# Patient Record
Sex: FEMALE | Race: WHITE | Employment: FULL TIME | URBAN - METROPOLITAN AREA
[De-identification: names, ages, dates, MRNs, and addresses within clinical notes are randomized per-mention and may not be internally consistent; named-entity substitution may affect disease eponyms.]

---

## 2017-01-09 RX ORDER — TRAMADOL HYDROCHLORIDE 50 MG/1
TABLET ORAL
Qty: 90 TABLET | Refills: 0 | Status: SHIPPED | OUTPATIENT
Start: 2017-01-09 | End: 2017-08-11 | Stop reason: SDUPTHER

## 2017-02-13 RX ORDER — RABEPRAZOLE SODIUM 20 MG
TABLET, DELAYED RELEASE (ENTERIC COATED) ORAL
Qty: 90 TABLET | Refills: 2 | Status: SHIPPED | OUTPATIENT
Start: 2017-02-13 | End: 2017-08-11 | Stop reason: SDUPTHER

## 2017-05-15 ENCOUNTER — TELEPHONE (OUTPATIENT)
Dept: FAMILY MEDICINE CLINIC | Age: 52
End: 2017-05-15

## 2017-08-11 ENCOUNTER — TELEPHONE (OUTPATIENT)
Dept: FAMILY MEDICINE CLINIC | Age: 52
End: 2017-08-11

## 2017-08-11 ENCOUNTER — OFFICE VISIT (OUTPATIENT)
Dept: FAMILY MEDICINE CLINIC | Age: 52
End: 2017-08-11
Payer: COMMERCIAL

## 2017-08-11 VITALS
HEIGHT: 60 IN | OXYGEN SATURATION: 98 % | BODY MASS INDEX: 29.21 KG/M2 | RESPIRATION RATE: 12 BRPM | HEART RATE: 67 BPM | DIASTOLIC BLOOD PRESSURE: 64 MMHG | SYSTOLIC BLOOD PRESSURE: 118 MMHG | WEIGHT: 148.8 LBS

## 2017-08-11 DIAGNOSIS — Z98.84 HISTORY OF ROUX-EN-Y GASTRIC BYPASS: ICD-10-CM

## 2017-08-11 DIAGNOSIS — M25.551 RIGHT HIP PAIN: ICD-10-CM

## 2017-08-11 DIAGNOSIS — R25.2 MUSCLE CRAMPS: ICD-10-CM

## 2017-08-11 DIAGNOSIS — Z00.00 WELL ADULT HEALTH CHECK: Primary | ICD-10-CM

## 2017-08-11 DIAGNOSIS — K21.9 GASTROESOPHAGEAL REFLUX DISEASE, ESOPHAGITIS PRESENCE NOT SPECIFIED: ICD-10-CM

## 2017-08-11 DIAGNOSIS — R07.9 CHEST PAIN SYNDROME: ICD-10-CM

## 2017-08-11 DIAGNOSIS — M25.50 POLYARTHRALGIA: ICD-10-CM

## 2017-08-11 DIAGNOSIS — Z78.0 POST-MENOPAUSAL: ICD-10-CM

## 2017-08-11 DIAGNOSIS — M25.552 LEFT HIP PAIN: ICD-10-CM

## 2017-08-11 PROCEDURE — 99396 PREV VISIT EST AGE 40-64: CPT | Performed by: FAMILY MEDICINE

## 2017-08-11 RX ORDER — TRAMADOL HYDROCHLORIDE 50 MG/1
TABLET ORAL
Qty: 90 TABLET | Refills: 0 | Status: SHIPPED | OUTPATIENT
Start: 2017-08-11 | End: 2017-08-15 | Stop reason: SDUPTHER

## 2017-08-11 RX ORDER — RABEPRAZOLE SODIUM 20 MG
TABLET, DELAYED RELEASE (ENTERIC COATED) ORAL
Qty: 90 TABLET | Refills: 3 | Status: SHIPPED | OUTPATIENT
Start: 2017-08-11 | End: 2017-08-11

## 2017-08-11 RX ORDER — METOPROLOL SUCCINATE 25 MG/1
25 TABLET, EXTENDED RELEASE ORAL 2 TIMES DAILY
Qty: 180 TABLET | Refills: 3 | Status: SHIPPED | OUTPATIENT
Start: 2017-08-11 | End: 2017-08-21

## 2017-08-11 RX ORDER — PANTOPRAZOLE SODIUM 40 MG/1
40 TABLET, DELAYED RELEASE ORAL
Qty: 90 TABLET | Refills: 3 | Status: SHIPPED | OUTPATIENT
Start: 2017-08-11 | End: 2018-03-19 | Stop reason: ALTCHOICE

## 2017-08-11 ASSESSMENT — PATIENT HEALTH QUESTIONNAIRE - PHQ9
SUM OF ALL RESPONSES TO PHQ QUESTIONS 1-9: 0
1. LITTLE INTEREST OR PLEASURE IN DOING THINGS: 0
SUM OF ALL RESPONSES TO PHQ9 QUESTIONS 1 & 2: 0
2. FEELING DOWN, DEPRESSED OR HOPELESS: 0

## 2017-08-11 ASSESSMENT — ENCOUNTER SYMPTOMS
RESPIRATORY NEGATIVE: 1
GASTROINTESTINAL NEGATIVE: 1

## 2017-08-14 ENCOUNTER — PATIENT MESSAGE (OUTPATIENT)
Dept: FAMILY MEDICINE CLINIC | Age: 52
End: 2017-08-14

## 2017-08-14 DIAGNOSIS — M25.50 POLYARTHRALGIA: ICD-10-CM

## 2017-08-15 RX ORDER — TRAMADOL HYDROCHLORIDE 50 MG/1
TABLET ORAL
Qty: 90 TABLET | Refills: 0 | Status: SHIPPED | OUTPATIENT
Start: 2017-08-15 | End: 2018-08-13 | Stop reason: SDUPTHER

## 2017-08-29 ENCOUNTER — HOSPITAL ENCOUNTER (OUTPATIENT)
Age: 52
Discharge: HOME | End: 2017-08-29
Payer: COMMERCIAL

## 2017-08-29 ENCOUNTER — HOSPITAL ENCOUNTER (OUTPATIENT)
Dept: GENERAL RADIOLOGY | Age: 52
Discharge: HOME | End: 2017-08-29
Payer: COMMERCIAL

## 2017-08-29 DIAGNOSIS — M25.551 RIGHT HIP PAIN: ICD-10-CM

## 2017-08-29 DIAGNOSIS — M25.552 LEFT HIP PAIN: ICD-10-CM

## 2017-08-29 PROCEDURE — 73502 X-RAY EXAM HIP UNI 2-3 VIEWS: CPT

## 2018-03-19 ENCOUNTER — PATIENT MESSAGE (OUTPATIENT)
Dept: FAMILY MEDICINE CLINIC | Age: 53
End: 2018-03-19

## 2018-03-19 DIAGNOSIS — K21.9 GASTROESOPHAGEAL REFLUX DISEASE, ESOPHAGITIS PRESENCE NOT SPECIFIED: Primary | ICD-10-CM

## 2018-03-19 RX ORDER — RABEPRAZOLE SODIUM 20 MG/1
20 TABLET, DELAYED RELEASE ORAL DAILY
Qty: 30 TABLET | Refills: 11 | Status: SHIPPED | OUTPATIENT
Start: 2018-03-19 | End: 2018-05-15 | Stop reason: SDUPTHER

## 2018-05-15 DIAGNOSIS — K21.9 GASTROESOPHAGEAL REFLUX DISEASE, ESOPHAGITIS PRESENCE NOT SPECIFIED: ICD-10-CM

## 2018-05-15 RX ORDER — RABEPRAZOLE SODIUM 20 MG/1
20 TABLET, DELAYED RELEASE ORAL DAILY
Qty: 90 TABLET | Refills: 3 | Status: SHIPPED | OUTPATIENT
Start: 2018-05-15 | End: 2019-07-29 | Stop reason: SDUPTHER

## 2018-08-13 ENCOUNTER — OFFICE VISIT (OUTPATIENT)
Dept: FAMILY MEDICINE CLINIC | Age: 53
End: 2018-08-13
Payer: COMMERCIAL

## 2018-08-13 VITALS
HEART RATE: 68 BPM | BODY MASS INDEX: 28.84 KG/M2 | RESPIRATION RATE: 16 BRPM | HEIGHT: 63 IN | WEIGHT: 162.8 LBS | DIASTOLIC BLOOD PRESSURE: 74 MMHG | SYSTOLIC BLOOD PRESSURE: 128 MMHG

## 2018-08-13 DIAGNOSIS — K21.9 GASTROESOPHAGEAL REFLUX DISEASE, ESOPHAGITIS PRESENCE NOT SPECIFIED: ICD-10-CM

## 2018-08-13 DIAGNOSIS — Z12.31 ENCOUNTER FOR SCREENING MAMMOGRAM FOR BREAST CANCER: ICD-10-CM

## 2018-08-13 DIAGNOSIS — M25.551 RIGHT HIP PAIN: ICD-10-CM

## 2018-08-13 DIAGNOSIS — Z00.00 WELL ADULT HEALTH CHECK: Primary | ICD-10-CM

## 2018-08-13 DIAGNOSIS — Z78.0 POST-MENOPAUSAL: ICD-10-CM

## 2018-08-13 DIAGNOSIS — Z98.84 HISTORY OF ROUX-EN-Y GASTRIC BYPASS: ICD-10-CM

## 2018-08-13 DIAGNOSIS — M25.50 POLYARTHRALGIA: ICD-10-CM

## 2018-08-13 PROCEDURE — 99396 PREV VISIT EST AGE 40-64: CPT | Performed by: FAMILY MEDICINE

## 2018-08-13 RX ORDER — TRAMADOL HYDROCHLORIDE 50 MG/1
TABLET ORAL
Qty: 90 TABLET | Refills: 0 | Status: SHIPPED | OUTPATIENT
Start: 2018-08-13 | End: 2018-11-13

## 2018-08-13 ASSESSMENT — PATIENT HEALTH QUESTIONNAIRE - PHQ9
SUM OF ALL RESPONSES TO PHQ QUESTIONS 1-9: 0
1. LITTLE INTEREST OR PLEASURE IN DOING THINGS: 0
SUM OF ALL RESPONSES TO PHQ9 QUESTIONS 1 & 2: 0
SUM OF ALL RESPONSES TO PHQ QUESTIONS 1-9: 0
2. FEELING DOWN, DEPRESSED OR HOPELESS: 0

## 2018-08-13 ASSESSMENT — ENCOUNTER SYMPTOMS
RESPIRATORY NEGATIVE: 1
GASTROINTESTINAL NEGATIVE: 1

## 2018-08-13 NOTE — PROGRESS NOTES
Subjective:      Patient ID: Sumanth Everett is a 47 y.o. female. HPI:    Chief Complaint   Patient presents with    Annual Exam    Results     Annual eval.  Doing well overall. GERD well controlled. Hx of rahul-en-Y. Weight has creeped up from this time last year. Wt Readings from Last 3 Encounters:   08/13/18 162 lb 12.8 oz (73.8 kg)   08/11/17 148 lb 12.8 oz (67.5 kg)   07/25/16 157 lb (71.2 kg)     Labs in the fall looked fine. Pt continues to have bl hip pain. Rheum work up neg. Right hip > left. XRs neg. Right hip will \"pop out\" at times. Patient Active Problem List   Diagnosis    GERD (gastroesophageal reflux disease)    Post-menopausal    History of Rahul-en-Y gastric bypass    DDD (degenerative disc disease), cervical    Chest pain syndrome    SOB (shortness of breath)     Past Surgical History:   Procedure Laterality Date    BREAST SURGERY      tumors    GASTRIC BYPASS SURGERY  2006    HERNIA REPAIR  1969    HYSTERECTOMY      OVARIAN CYST REMOVAL      multiple    TONSILLECTOMY      TOOTH EXTRACTION  2018    UPPER GASTROINTESTINAL ENDOSCOPY       Prior to Admission medications    Medication Sig Start Date End Date Taking? Authorizing Provider   metoprolol tartrate (LOPRESSOR) 25 MG tablet TAKE 1 TABLET TWICE A DAY 8/13/18  Yes Sanjay Shea DO   RABEprazole (ACIPHEX) 20 MG tablet Take 1 tablet by mouth daily 5/15/18  Yes Sanjay Shea DO   traMADol (ULTRAM) 50 MG tablet TAKE 1 TABLET BY MOUTH EVERY 8 HOURS AS NEEDED FOR PAIN 8/15/17  Yes Sanjay Shea DO   aspirin 81 MG tablet Take 81 mg by mouth daily. Yes Historical Provider, MD   estrogens, conjugated, (PREMARIN) 0.625 MG tablet Take 0.625 mg by mouth daily.    Yes Historical Provider, MD       No results found for: LABA1C  No results found for: EAG    No components found for: CHLPL  Lab Results   Component Value Date    TRIG 82 02/14/2017    TRIG 69 02/19/2016    TRIG 78 02/16/2015     Lab Results

## 2018-08-13 NOTE — PROGRESS NOTES
Chronic Disease Visit Information    BP Readings from Last 3 Encounters:   08/13/18 128/74   08/11/17 118/64   07/25/16 120/80          LDL Calculated (mg/dl)   Date Value   02/14/2017 101     HDL (mg/dl)   Date Value   02/14/2017 91     BUN (mg/dl)   Date Value   02/14/2017 12     CREATININE (mg/dl)   Date Value   02/14/2017 0.7     Glucose (mg/dl)   Date Value   02/14/2017 88            Have you changed or started any medications since your last visit including any over-the-counter medicines, vitamins, or herbal medicines? no   Are you having any side effects from any of your medications? -  no  Have you stopped taking any of your medications? Is so, why? -  no    Have you seen any other physician or provider since your last visit? No  Have you had any other diagnostic tests since your last visit? No  Have you been seen in the emergency room and/or had an admission to a hospital since we last saw you? No  Have you had your annual diabetic retinal (eye) exam? No  Have you had your routine dental cleaning in the past 6 months? No    Have you activated your [employer] account? If not, what are your barriers?  Yes     Patient Care Team:  Juan Carlos Govea DO as PCP - General (Family Medicine)  Juan Carlos Govea DO as PCP - [facility] Attributed Provider  Lake Bergman MD as Obstetrician (Obstetrics & Gynecology)         Medical History Review  Past Medical, Family, and Social History reviewed and does contribute to the patient presenting condition    Health Maintenance   Topic Date Due    HIV screen  10/07/1981    DTaP/Tdap/Td vaccine (1 - Tdap) 10/07/1985    Cervical cancer screen  10/07/1987    Shingles Vaccine (1 of 2 - 2 Dose Series) 10/07/2016    Colon cancer screen colonoscopy  10/07/2016    Breast cancer screen  08/19/2017    Flu vaccine (1) 09/01/2018    Lipid screen  02/14/2022   ;

## 2018-08-21 ENCOUNTER — PATIENT MESSAGE (OUTPATIENT)
Dept: FAMILY MEDICINE CLINIC | Age: 53
End: 2018-08-21

## 2018-08-21 RX ORDER — VALACYCLOVIR HYDROCHLORIDE 1 G/1
1000 TABLET, FILM COATED ORAL 3 TIMES DAILY
Qty: 21 TABLET | Refills: 0 | Status: SHIPPED | OUTPATIENT
Start: 2018-08-21 | End: 2018-08-22 | Stop reason: SDUPTHER

## 2018-08-22 RX ORDER — VALACYCLOVIR HYDROCHLORIDE 1 G/1
1000 TABLET, FILM COATED ORAL 3 TIMES DAILY
Qty: 21 TABLET | Refills: 0 | Status: SHIPPED | OUTPATIENT
Start: 2018-08-22 | End: 2018-08-29

## 2018-08-22 NOTE — TELEPHONE ENCOUNTER
From: Tania Mayer  To: Delmi Rider DO  Sent: 8/21/2018 3:16 PM EDT  Subject: Non-Urgent Medical Question    As long as I keep the lesions covered with bandages and keep my hands washed am I ok to work. I feel ok and I know shingles are contagious. None of the lesions are open at this point. They are just itchy.     ----- Message -----  From: Delmi Rider DO  Sent: 8/21/2018 8:39 AM EDT  To: Murtaza Mayer  Subject: RE: Non-Urgent Medical Question  Agree that you likely have shingles. rx for Valtrex has been sent to your pharmacy to start ASAP.    ----- Message -----   From: Tania Mayer   Sent: 8/21/2018 8:14 AM EDT   To: Delmi Rider DO  Subject: Non-Urgent Medical Question    I saw Dr Amita Galicia last Monday and then on Friday 8/17 I started with a tingling sensitivity in my right arm and neck. Now I have an area on my right shoulder that is a cluster of blisters and it has spread a few blisters up on my shoulder. My neck is sore. I'm afraid it is shingles. Do I need to come back in or can something be sent in to [facility] for me.    Thanks  Sebastián Mcgregor  [phone]

## 2019-07-29 DIAGNOSIS — K21.9 GASTROESOPHAGEAL REFLUX DISEASE, ESOPHAGITIS PRESENCE NOT SPECIFIED: ICD-10-CM

## 2019-07-30 RX ORDER — RABEPRAZOLE SODIUM 20 MG/1
20 TABLET, DELAYED RELEASE ORAL DAILY
Qty: 90 TABLET | Refills: 3 | Status: SHIPPED | OUTPATIENT
Start: 2019-07-30 | End: 2020-08-10 | Stop reason: SDUPTHER

## 2019-10-11 ENCOUNTER — HOSPITAL ENCOUNTER (OUTPATIENT)
Dept: WOMENS IMAGING | Age: 54
Discharge: HOME | End: 2019-10-11
Payer: COMMERCIAL

## 2019-10-11 DIAGNOSIS — Z12.31 VISIT FOR SCREENING MAMMOGRAM: ICD-10-CM

## 2019-10-11 PROCEDURE — 77063 BREAST TOMOSYNTHESIS BI: CPT

## 2019-11-08 ENCOUNTER — OFFICE VISIT (OUTPATIENT)
Dept: FAMILY MEDICINE CLINIC | Age: 54
End: 2019-11-08
Payer: COMMERCIAL

## 2019-11-08 VITALS
BODY MASS INDEX: 28.49 KG/M2 | WEIGHT: 166.9 LBS | DIASTOLIC BLOOD PRESSURE: 80 MMHG | SYSTOLIC BLOOD PRESSURE: 120 MMHG | HEART RATE: 58 BPM | HEIGHT: 64 IN | RESPIRATION RATE: 13 BRPM | TEMPERATURE: 98 F | OXYGEN SATURATION: 98 %

## 2019-11-08 DIAGNOSIS — J34.89 SORE IN NOSE: Primary | ICD-10-CM

## 2019-11-08 PROCEDURE — 99213 OFFICE O/P EST LOW 20 MIN: CPT | Performed by: FAMILY MEDICINE

## 2019-11-08 RX ORDER — TRIAMCINOLONE ACETONIDE 0.25 MG/G
OINTMENT TOPICAL
Qty: 15 G | Refills: 1 | Status: SHIPPED | OUTPATIENT
Start: 2019-11-08 | End: 2019-11-15

## 2019-11-08 ASSESSMENT — PATIENT HEALTH QUESTIONNAIRE - PHQ9
2. FEELING DOWN, DEPRESSED OR HOPELESS: 0
SUM OF ALL RESPONSES TO PHQ QUESTIONS 1-9: 0
SUM OF ALL RESPONSES TO PHQ QUESTIONS 1-9: 0
1. LITTLE INTEREST OR PLEASURE IN DOING THINGS: 0
SUM OF ALL RESPONSES TO PHQ9 QUESTIONS 1 & 2: 0

## 2019-11-08 ASSESSMENT — ENCOUNTER SYMPTOMS
GASTROINTESTINAL NEGATIVE: 1
RESPIRATORY NEGATIVE: 1

## 2020-01-20 ENCOUNTER — HOSPITAL ENCOUNTER (EMERGENCY)
Age: 55
Discharge: HOME | End: 2020-01-20
Payer: COMMERCIAL

## 2020-01-20 VITALS
BODY MASS INDEX: 28.17 KG/M2 | RESPIRATION RATE: 20 BRPM | WEIGHT: 165 LBS | SYSTOLIC BLOOD PRESSURE: 155 MMHG | HEART RATE: 91 BPM | TEMPERATURE: 100.9 F | HEIGHT: 64 IN | DIASTOLIC BLOOD PRESSURE: 93 MMHG | OXYGEN SATURATION: 96 %

## 2020-01-20 LAB
FLU A ANTIGEN: POSITIVE
INFLUENZA B AG, EIA: NEGATIVE

## 2020-01-20 PROCEDURE — 99214 OFFICE O/P EST MOD 30 MIN: CPT

## 2020-01-20 PROCEDURE — 99213 OFFICE O/P EST LOW 20 MIN: CPT | Performed by: NURSE PRACTITIONER

## 2020-01-20 PROCEDURE — 87804 INFLUENZA ASSAY W/OPTIC: CPT

## 2020-01-20 RX ORDER — ALBUTEROL SULFATE 90 UG/1
2 AEROSOL, METERED RESPIRATORY (INHALATION) EVERY 6 HOURS PRN
Qty: 1 INHALER | Refills: 0 | Status: SHIPPED | OUTPATIENT
Start: 2020-01-20 | End: 2021-08-06

## 2020-01-20 RX ORDER — CYANOCOBALAMIN (VITAMIN B-12) 1000 MCG
1 TABLET, EXTENDED RELEASE ORAL 2 TIMES DAILY WITH MEALS
COMMUNITY
End: 2022-06-15

## 2020-01-20 RX ORDER — ACETAMINOPHEN 325 MG/1
650 TABLET ORAL EVERY 6 HOURS PRN
COMMUNITY
End: 2022-06-15

## 2020-01-20 RX ORDER — ONDANSETRON 4 MG/1
4 TABLET, ORALLY DISINTEGRATING ORAL EVERY 8 HOURS PRN
Qty: 15 TABLET | Refills: 0 | Status: SHIPPED | OUTPATIENT
Start: 2020-01-20 | End: 2021-08-06

## 2020-01-20 RX ORDER — AZELASTINE 1 MG/ML
1 SPRAY, METERED NASAL 2 TIMES DAILY
Qty: 1 BOTTLE | Refills: 0 | Status: SHIPPED | OUTPATIENT
Start: 2020-01-20 | End: 2021-08-06

## 2020-01-20 RX ORDER — IBUPROFEN 400 MG/1
400 TABLET ORAL EVERY 6 HOURS PRN
Qty: 30 TABLET | Refills: 0 | Status: SHIPPED | OUTPATIENT
Start: 2020-01-20 | End: 2022-06-15

## 2020-01-20 RX ORDER — OSELTAMIVIR PHOSPHATE 75 MG/1
75 CAPSULE ORAL 2 TIMES DAILY
Qty: 10 CAPSULE | Refills: 0 | Status: SHIPPED | OUTPATIENT
Start: 2020-01-20 | End: 2020-01-25

## 2020-01-20 ASSESSMENT — ENCOUNTER SYMPTOMS
ANAL BLEEDING: 0
DIARRHEA: 0
CHOKING: 0
BLOOD IN STOOL: 0
SORE THROAT: 1
APNEA: 0
VOMITING: 0
NAUSEA: 1
ABDOMINAL DISTENTION: 0
ABDOMINAL PAIN: 0
WHEEZING: 0
COUGH: 1
STRIDOR: 0
RHINORRHEA: 1
SHORTNESS OF BREATH: 0
CHEST TIGHTNESS: 1
SINUS PRESSURE: 1

## 2020-01-20 ASSESSMENT — PAIN SCALES - GENERAL: PAINLEVEL_OUTOF10: 5

## 2020-01-20 ASSESSMENT — PAIN DESCRIPTION - PAIN TYPE: TYPE: ACUTE PAIN

## 2020-01-20 ASSESSMENT — PAIN DESCRIPTION - DESCRIPTORS: DESCRIPTORS: DISCOMFORT

## 2020-01-20 ASSESSMENT — PAIN - FUNCTIONAL ASSESSMENT: PAIN_FUNCTIONAL_ASSESSMENT: PREVENTS OR INTERFERES SOME ACTIVE ACTIVITIES AND ADLS

## 2020-01-20 ASSESSMENT — PAIN DESCRIPTION - LOCATION: LOCATION: GENERALIZED

## 2020-01-20 NOTE — ED PROVIDER NOTES
[facility]  Urgent Care Encounter      CHIEF COMPLAINT       Chief Complaint   Patient presents with    Fever     103.7 at home    Cough    Generalized Body Aches       Nurses Notes reviewed and I agree except as noted in the HPI. HISTORY OFPRESENT ILLNESS   Jeffry Boateng Age is a 49 y.o. The history is provided by the patient and a relative. No  was used. URI   Presenting symptoms: congestion, cough, fatigue, rhinorrhea and sore throat    Associated symptoms: headaches and myalgias    Associated symptoms: no wheezing        REVIEW OF SYSTEMS     Review of Systems   Constitutional: Positive for activity change, chills and fatigue. Negative for diaphoresis. HENT: Positive for congestion, rhinorrhea, sinus pressure and sore throat. Respiratory: Positive for cough and chest tightness. Negative for apnea, choking, shortness of breath, wheezing and stridor. Cardiovascular: Negative for chest pain, palpitations and leg swelling. Gastrointestinal: Positive for nausea. Negative for abdominal distention, abdominal pain, anal bleeding, blood in stool, diarrhea and vomiting. Musculoskeletal: Positive for myalgias. Neurological: Positive for headaches. PAST MEDICAL HISTORY         Diagnosis Date    Arthritis        SURGICAL HISTORY     Patient  has a past surgical history that includes Gastric bypass surgery (2006); Hysterectomy; ovarian cyst removal; hernia repair (1969); Tonsillectomy; Breast surgery; Upper gastrointestinal endoscopy; and Tooth Extraction (2018).     CURRENT MEDICATIONS       Discharge Medication List as of 1/20/2020 12:52 PM      CONTINUE these medications which have NOT CHANGED    Details   acetaminophen (TYLENOL) 325 MG tablet Take 650 mg by mouth every 6 hours as needed for PainHistorical Med      dextromethorphan-guaiFENesin (MUCINEX DM)  MG per extended release tablet Take 1 tablet by mouth every 12 hours as neededHistorical Med normal. No tachypnea, bradypnea, accessory muscle usage, prolonged expiration, respiratory distress or retractions. She is not intubated. Breath sounds: Normal breath sounds. No stridor, decreased air movement or transmitted upper airway sounds. No decreased breath sounds, wheezing, rhonchi or rales. Chest:      Chest wall: No tenderness. Musculoskeletal: Normal range of motion. Skin:     General: Skin is warm. Neurological:      General: No focal deficit present. Mental Status: She is alert and oriented to person, place, and time. Psychiatric:         Mood and Affect: Mood normal.         Behavior: Behavior normal. Behavior is cooperative. Thought Content: Thought content normal.         Judgment: Judgment normal.         DIAGNOSTIC RESULTS   Labs:  Results for orders placed or performed during the hospital encounter of 01/20/20   Rapid influenza A/B antigens   Result Value Ref Range    Flu A Antigen POSITIVE (A) NEGATIVE    Influenza B Ag, EIA NEGATIVE NEGATIVE       IMAGING:  No orders to display     URGENT CARE COURSE:     Vitals:    01/20/20 1221   BP: (!) 155/93   Pulse: 91   Resp: 20   Temp: 100.9 °F (38.3 °C)   TempSrc: Temporal   SpO2: 96%   Weight: 165 lb (74.8 kg)   Height: 5' 4\" (1.626 m)       Medications - No data to display  PROCEDURES:  None  FINAL IMPRESSION      1. Influenza A H1N1 infection        DISPOSITION/PLAN   Decision To Discharge     The patient was advised to drink plenty of fluids. They may take Tylenol for fever or body aches. Take prescribed medication as directed. They may also take OTC antihistamines/ decongestant as needed. Pt is advised to go to ER if symptoms worsen, new symptoms develop, high fever >102, vomiting, breathing difficulty, lethargy, chest pain or shortness of breath Dial 911.  The patient or patient's representative is agreeable to the treatment plan they're advised to follow-up with her primary care provider in one week for reevaluation.     PATIENT REFERRED TO:  Sheri Ortiz Ridemanuelmireya [address]    Schedule an appointment as soon as possible for a visit in 1 week      [address] [facility]  [facility] [id number]  Go today  If symptoms worsen    DISCHARGE MEDICATIONS:  Discharge Medication List as of 1/20/2020 12:52 PM      START taking these medications    Details   azelastine (ASTELIN) 0.1 % nasal spray 1 spray by Nasal route 2 times daily for 7 days Use in each nostril as directed, Disp-1 Bottle, R-0Normal      ibuprofen (IBU) 400 MG tablet Take 1 tablet by mouth every 6 hours as needed for Pain, Disp-30 tablet, R-0Normal      ondansetron (ZOFRAN ODT) 4 MG disintegrating tablet Take 1 tablet by mouth every 8 hours as needed for Nausea, Disp-15 tablet, R-0Normal      albuterol sulfate HFA (PROAIR HFA) 108 (90 Base) MCG/ACT inhaler Inhale 2 puffs into the lungs every 6 hours as needed for Wheezing or Shortness of Breath, Disp-1 Inhaler, R-0Normal         Tamiflu 75 mg 1 tablet twice daily for 5 days    Discharge Medication List as of 1/20/2020 12:52 PM          ELLIE Steele CNP, APRN - CNP  01/20/20 0836

## 2020-01-21 ENCOUNTER — TELEPHONE (OUTPATIENT)
Dept: FAMILY MEDICINE CLINIC | Age: 55
End: 2020-01-21

## 2020-04-13 ENCOUNTER — E-VISIT (OUTPATIENT)
Dept: FAMILY MEDICINE CLINIC | Age: 55
End: 2020-04-13
Payer: COMMERCIAL

## 2020-04-13 PROCEDURE — 99421 OL DIG E/M SVC 5-10 MIN: CPT | Performed by: FAMILY MEDICINE

## 2020-04-13 RX ORDER — ACYCLOVIR 400 MG/1
400 TABLET ORAL 3 TIMES DAILY
Qty: 21 TABLET | Refills: 0 | Status: SHIPPED | OUTPATIENT
Start: 2020-04-13 | End: 2020-04-20

## 2020-05-21 ENCOUNTER — PATIENT MESSAGE (OUTPATIENT)
Dept: FAMILY MEDICINE CLINIC | Age: 55
End: 2020-05-21

## 2020-08-10 RX ORDER — RABEPRAZOLE SODIUM 20 MG/1
20 TABLET, DELAYED RELEASE ORAL DAILY
Qty: 90 TABLET | Refills: 3 | Status: SHIPPED | OUTPATIENT
Start: 2020-08-10 | End: 2021-08-02

## 2020-11-05 ENCOUNTER — PATIENT MESSAGE (OUTPATIENT)
Dept: FAMILY MEDICINE CLINIC | Age: 55
End: 2020-11-05

## 2020-11-05 RX ORDER — ONDANSETRON 4 MG/1
4 TABLET, ORALLY DISINTEGRATING ORAL EVERY 8 HOURS PRN
Qty: 15 TABLET | Refills: 0 | Status: SHIPPED | OUTPATIENT
Start: 2020-11-05 | End: 2021-08-06

## 2021-07-30 DIAGNOSIS — K21.9 GASTROESOPHAGEAL REFLUX DISEASE: ICD-10-CM

## 2021-08-02 RX ORDER — RABEPRAZOLE SODIUM 20 MG/1
TABLET, DELAYED RELEASE ORAL
Qty: 90 TABLET | Refills: 0 | Status: SHIPPED | OUTPATIENT
Start: 2021-08-02 | End: 2021-08-06 | Stop reason: SDUPTHER

## 2021-08-06 ENCOUNTER — OFFICE VISIT (OUTPATIENT)
Dept: FAMILY MEDICINE CLINIC | Age: 56
End: 2021-08-06
Payer: COMMERCIAL

## 2021-08-06 VITALS
DIASTOLIC BLOOD PRESSURE: 72 MMHG | RESPIRATION RATE: 12 BRPM | WEIGHT: 154.5 LBS | SYSTOLIC BLOOD PRESSURE: 116 MMHG | HEIGHT: 63 IN | HEART RATE: 72 BPM | BODY MASS INDEX: 27.38 KG/M2

## 2021-08-06 DIAGNOSIS — R25.2 MUSCLE CRAMPS: ICD-10-CM

## 2021-08-06 DIAGNOSIS — Z98.84 HISTORY OF ROUX-EN-Y GASTRIC BYPASS: ICD-10-CM

## 2021-08-06 DIAGNOSIS — Z00.00 WELL ADULT HEALTH CHECK: Primary | ICD-10-CM

## 2021-08-06 DIAGNOSIS — M25.50 POLYARTHRALGIA: ICD-10-CM

## 2021-08-06 DIAGNOSIS — Z78.0 POST-MENOPAUSAL: ICD-10-CM

## 2021-08-06 DIAGNOSIS — R07.9 CHEST PAIN SYNDROME: ICD-10-CM

## 2021-08-06 DIAGNOSIS — K21.9 GASTROESOPHAGEAL REFLUX DISEASE, UNSPECIFIED WHETHER ESOPHAGITIS PRESENT: ICD-10-CM

## 2021-08-06 PROCEDURE — 99396 PREV VISIT EST AGE 40-64: CPT | Performed by: FAMILY MEDICINE

## 2021-08-06 RX ORDER — ASPIRIN 81 MG/1
81 TABLET, CHEWABLE ORAL DAILY
COMMUNITY

## 2021-08-06 RX ORDER — RABEPRAZOLE SODIUM 20 MG/1
TABLET, DELAYED RELEASE ORAL
Qty: 90 TABLET | Refills: 3 | Status: SHIPPED | OUTPATIENT
Start: 2021-08-06 | End: 2022-08-09

## 2021-08-06 SDOH — ECONOMIC STABILITY: FOOD INSECURITY: WITHIN THE PAST 12 MONTHS, THE FOOD YOU BOUGHT JUST DIDN'T LAST AND YOU DIDN'T HAVE MONEY TO GET MORE.: NEVER TRUE

## 2021-08-06 SDOH — ECONOMIC STABILITY: FOOD INSECURITY: WITHIN THE PAST 12 MONTHS, YOU WORRIED THAT YOUR FOOD WOULD RUN OUT BEFORE YOU GOT MONEY TO BUY MORE.: NEVER TRUE

## 2021-08-06 ASSESSMENT — PATIENT HEALTH QUESTIONNAIRE - PHQ9
SUM OF ALL RESPONSES TO PHQ QUESTIONS 1-9: 0
1. LITTLE INTEREST OR PLEASURE IN DOING THINGS: 0
SUM OF ALL RESPONSES TO PHQ9 QUESTIONS 1 & 2: 0
SUM OF ALL RESPONSES TO PHQ QUESTIONS 1-9: 0
2. FEELING DOWN, DEPRESSED OR HOPELESS: 0
SUM OF ALL RESPONSES TO PHQ QUESTIONS 1-9: 0

## 2021-08-06 ASSESSMENT — ENCOUNTER SYMPTOMS
GASTROINTESTINAL NEGATIVE: 1
RESPIRATORY NEGATIVE: 1

## 2021-08-06 ASSESSMENT — SOCIAL DETERMINANTS OF HEALTH (SDOH): HOW HARD IS IT FOR YOU TO PAY FOR THE VERY BASICS LIKE FOOD, HOUSING, MEDICAL CARE, AND HEATING?: NOT HARD AT ALL

## 2021-08-06 NOTE — PROGRESS NOTES
Chronic Disease Visit Information    BP Readings from Last 3 Encounters:   08/06/21 116/72   01/20/20 (!) 155/93   11/08/19 120/80          LDL Calculated (mg/dl)   Date Value   02/14/2017 101     HDL (mg/dl)   Date Value   02/14/2017 91     BUN (mg/dl)   Date Value   02/14/2017 12     CREATININE (mg/dl)   Date Value   02/14/2017 0.7     Glucose (mg/dl)   Date Value   02/14/2017 88            Have you changed or started any medications since your last visit including any over-the-counter medicines, vitamins, or herbal medicines? no   Are you having any side effects from any of your medications? -  no  Have you stopped taking any of your medications? Is so, why? -  no    Have you seen any other physician or provider since your last visit? No  Have you had any other diagnostic tests since your last visit? No  Have you been seen in the emergency room and/or had an admission to a hospital since we last saw you? No  Have you had your annual diabetic retinal (eye) exam? No  Have you had your routine dental cleaning in the past 6 months? no    Have you activated your [employer] account? If not, what are your barriers?  Yes     Patient Care Team:  Felicia English DO as PCP - General (Family Medicine)  Felicia English DO as PCP - [facility]  Olga Carnes MD as Obstetrician (Obstetrics & Gynecology)         Medical History Review  Past Medical, Family, and Social History reviewed and does contribute to the patient presenting condition    Health Maintenance   Topic Date Due    Hepatitis C screen  Never done    COVID-19 Vaccine (1) Never done    HIV screen  Never done    DTaP/Tdap/Td vaccine (1 - Tdap) Never done    Cervical cancer screen  Never done    Diabetes screen  Never done    Colon cancer screen colonoscopy  Never done    Shingles Vaccine (1 of 2) Never done    Flu vaccine (1) 09/01/2021    Breast cancer screen  10/11/2021    Lipid screen  02/14/2022    Hepatitis A vaccine  Aged Out  Hepatitis B vaccine  Aged Out    Hib vaccine  Aged Out    Meningococcal (ACWY) vaccine  Aged Out    Pneumococcal 0-64 years Vaccine  Aged Out

## 2021-08-06 NOTE — PROGRESS NOTES
2021    Therese Mayer (:  1965) is a 48 y.o. female, here for a preventive medicine evaluation. Chief Complaint   Patient presents with    Annual Exam    Other     leg and hand cramps     Annual eval.  Doing well overall. Pt concerned today about leg and hand cramps for the last several months. Drinks very little caffeine. This is nightly. Drink plenty of water. BP well controlled. BP Readings from Last 3 Encounters:   21 116/72   20 (!) 155/93   19 120/80     Weight is down 11 lbs since last year. Wt Readings from Last 3 Encounters:   21 154 lb 8 oz (70.1 kg)   20 165 lb (74.8 kg)   19 166 lb 14.4 oz (75.7 kg)     Also c/o multiple joint pains. No swelling. Very stiff in the am.    Patient Active Problem List   Diagnosis    GERD (gastroesophageal reflux disease)    Post-menopausal    History of Rahul-en-Y gastric bypass    DDD (degenerative disc disease), cervical    Chest pain syndrome    SOB (shortness of breath)       Review of Systems   Constitutional: Negative. HENT: Negative. Respiratory: Negative. Cardiovascular: Negative. Gastrointestinal: Negative. Musculoskeletal: Positive for arthralgias and myalgias. All other systems reviewed and are negative. Prior to Visit Medications    Medication Sig Taking?  Authorizing Provider   aspirin 81 MG chewable tablet Take 81 mg by mouth daily Yes Historical Provider, MD   metoprolol tartrate (LOPRESSOR) 25 MG tablet TAKE 1 TABLET TWICE A DAY Yes Ayaka Patiño DO   RABEprazole (ACIPHEX) 20 MG tablet TAKE 1 TABLET BY MOUTH ONE TIME A DAY Yes Ayaka Patiño DO   acetaminophen (TYLENOL) 325 MG tablet Take 650 mg by mouth every 6 hours as needed for Pain Yes Historical Provider, MD   calcium citrate-vitamin D (CITRICAL + D) 315-250 MG-UNIT TABS per tablet Take 1 tablet by mouth 2 times daily (with meals) Yes Historical Provider, MD   ibuprofen (IBU) 400 MG tablet Take 1 tablet by mouth every 6 hours as needed for Pain Yes ELLIE Win CNP   estrogens, conjugated, (PREMARIN) 0.625 MG tablet Take 0.625 mg by mouth daily. Yes Historical Provider, MD        No Known Allergies    Past Medical History:   Diagnosis Date    Arthritis        Past Surgical History:   Procedure Laterality Date    BREAST SURGERY      tumors    GASTRIC BYPASS SURGERY  2006    HERNIA REPAIR  1969    HYSTERECTOMY      OVARIAN CYST REMOVAL      multiple    TONSILLECTOMY      TOOTH EXTRACTION  2018    UPPER GASTROINTESTINAL ENDOSCOPY         Social History     Socioeconomic History    Marital status:      Spouse name: Not on file    Number of children: Not on file    Years of education: Not on file    Highest education level: Not on file   Occupational History     Employer: [employer]   Tobacco Use    Smoking status: Never Smoker    Smokeless tobacco: Never Used   Vaping Use    Vaping Use: Never used   Substance and Sexual Activity    Alcohol use: Yes     Comment: occas    Drug use: No    Sexual activity: Yes     Partners: Male   Other Topics Concern    Not on file   Social History Narrative    Not on file     Social Determinants of Health     Financial Resource Strain: Low Risk     Difficulty of Paying Living Expenses: Not hard at all   Food Insecurity: No Food Insecurity    Worried About [address] in the Last Year: Never true    [address] [facility] in the Last Year: Never true   Transportation Needs:     Lack of Transportation (Medical):      Lack of Transportation (Non-Medical):    Physical Activity:     Days of Exercise per Week:     Minutes of Exercise per Session:    Stress:     Feeling of Stress :    Social Connections:     Frequency of Communication with Friends and Family:     Frequency of Social Gatherings with Friends and Family:     Attends [employer] Services:     Active Member of Clubs or Organizations:     Attends Club or Organization Meetings:     Marital Status:    Intimate Partner Violence:     Fear of Current or Ex-Partner:     Emotionally Abused:     Physically Abused:     Sexually Abused:         Family History   Problem Relation Age of Onset    High Blood Pressure Mother     High Blood Pressure Father        ADVANCE DIRECTIVE: N, <no information>    Vitals:    08/06/21 0836   BP: 116/72   Site: Left Upper Arm   Position: Sitting   Cuff Size: Medium Adult   Pulse: 72   Resp: 12   Weight: 154 lb 8 oz (70.1 kg)   Height: 5' 3\" (1.6 m)     Estimated body mass index is 27.37 kg/m² as calculated from the following:    Height as of this encounter: 5' 3\" (1.6 m). Weight as of this encounter: 154 lb 8 oz (70.1 kg). Physical Exam  Vitals and nursing note reviewed. Constitutional:       General: She is not in acute distress. Appearance: Normal appearance. She is well-developed. HENT:      Head: Normocephalic and atraumatic. Right Ear: Tympanic membrane normal.      Left Ear: Tympanic membrane normal.   Eyes:      Conjunctiva/sclera: Conjunctivae normal.   Cardiovascular:      Rate and Rhythm: Normal rate and regular rhythm. Heart sounds: Normal heart sounds. No murmur heard. Pulmonary:      Effort: Pulmonary effort is normal.      Breath sounds: Normal breath sounds. No wheezing, rhonchi or rales. Abdominal:      General: There is no distension. Musculoskeletal:      Cervical back: Neck supple. Skin:     General: Skin is warm and dry. Findings: No rash (on exposed surfaces). Neurological:      General: No focal deficit present. Mental Status: She is alert. Psychiatric:         Attention and Perception: Attention normal.         Mood and Affect: Mood normal.         Speech: Speech normal.         Behavior: Behavior normal. Behavior is cooperative. Thought Content: Thought content normal.         Judgment: Judgment normal.         No flowsheet data found.     Lab Results Component Value Date    CHOL 208 02/14/2017    CHOL 205 02/19/2016    CHOL 190 02/16/2015    TRIG 82 02/14/2017    TRIG 69 02/19/2016    TRIG 78 02/16/2015    HDL 91 02/14/2017    HDL 91 02/19/2016    HDL 77 02/16/2015    LDLCALC 101 02/14/2017    LDLCALC 100 02/19/2016    LDLCALC 97 02/16/2015    GLUCOSE 88 02/14/2017       The ASCVD Risk score (David Sotelo, et al., 2013) failed to calculate for the following reasons:    Cannot find a previous HDL lab    Cannot find a previous total cholesterol lab    Immunization History   Administered Date(s) Administered    Influenza A ([id number]) Vaccine PF IM 01/26/2010    Influenza Vaccine, unspecified formulation 10/01/2016    Influenza Virus Vaccine 09/26/2019       Health Maintenance   Topic Date Due    Hepatitis C screen  Never done    COVID-19 Vaccine (1) Never done    HIV screen  Never done    DTaP/Tdap/Td vaccine (1 - Tdap) Never done    Cervical cancer screen  Never done    Diabetes screen  Never done    Colon cancer screen colonoscopy  Never done    Shingles Vaccine (1 of 2) Never done    Flu vaccine (1) 09/01/2021    Breast cancer screen  10/11/2021    Lipid screen  02/14/2022    Hepatitis A vaccine  Aged Out    Hepatitis B vaccine  Aged Out    Hib vaccine  Aged Out    Meningococcal (ACWY) vaccine  Aged Out    Pneumococcal 0-64 years Vaccine  Aged Out          ASSESSMENT/PLAN:  1. Well adult health check  -     Lipid Panel w/ Reflex Direct LDL; Future  -     Comprehensive Metabolic Panel; Future  -     Hemoglobin A1C; Future  -     TSH with Reflex; Future  -     CBC Auto Differential; Future  2. Gastroesophageal reflux disease  -     RABEprazole (ACIPHEX) 20 MG tablet; TAKE 1 TABLET BY MOUTH ONE TIME A DAY, Disp-90 tablet, R-3Normal  -     CBC Auto Differential; Future  -     Magnesium; Future  3. Muscle cramps  4. Post-menopausal  5. Chest pain syndrome  6. History of Rahul-en-Y gastric bypass  -     Ferritin;  Future  -     Vitamin B12 & Folate; Future  -     Vitamin D 25 Hydroxy; Future  7. Polyarthralgia  -     Uric Acid; Future  -     Cyclic Citrul Peptide Antibody, IgG; Future  -     ERMA Screen with Reflex; Future  -     C-Reactive Protein; Future  -     Sedimentation Rate; Future  -     Rheumatoid Factor; Future  -     Anti-DNA Antibody, Double-Stranded; Future    -  Healthy lifestyle discussed  -  Chronic medical problems stable  -  Continue current medications  -  Follow up with specialists as scheduled  -  Check labs above, will call    Return in about 1 year (around 8/6/2022) for Wellness. An electronic signature was used to authenticate this note.     --Jhony Phelps, DO on 8/6/2021 at 9:19 AM

## 2022-06-15 ENCOUNTER — OFFICE VISIT (OUTPATIENT)
Dept: FAMILY MEDICINE CLINIC | Age: 57
End: 2022-06-15
Payer: COMMERCIAL

## 2022-06-15 VITALS
WEIGHT: 161.7 LBS | BODY MASS INDEX: 28.64 KG/M2 | DIASTOLIC BLOOD PRESSURE: 76 MMHG | HEART RATE: 88 BPM | RESPIRATION RATE: 16 BRPM | SYSTOLIC BLOOD PRESSURE: 146 MMHG

## 2022-06-15 DIAGNOSIS — R10.11 RUQ ABDOMINAL PAIN: Primary | ICD-10-CM

## 2022-06-15 DIAGNOSIS — K21.9 GASTROESOPHAGEAL REFLUX DISEASE, UNSPECIFIED WHETHER ESOPHAGITIS PRESENT: ICD-10-CM

## 2022-06-15 DIAGNOSIS — R07.9 RIGHT-SIDED CHEST PAIN: ICD-10-CM

## 2022-06-15 DIAGNOSIS — R11.2 NAUSEA AND VOMITING, INTRACTABILITY OF VOMITING NOT SPECIFIED, UNSPECIFIED VOMITING TYPE: ICD-10-CM

## 2022-06-15 PROCEDURE — 99214 OFFICE O/P EST MOD 30 MIN: CPT | Performed by: FAMILY MEDICINE

## 2022-06-15 RX ORDER — ONDANSETRON 4 MG/1
4 TABLET, ORALLY DISINTEGRATING ORAL EVERY 8 HOURS PRN
Qty: 30 TABLET | Refills: 0 | Status: SHIPPED | OUTPATIENT
Start: 2022-06-15

## 2022-06-15 RX ORDER — FAMOTIDINE 20 MG/1
20 TABLET, FILM COATED ORAL 2 TIMES DAILY
Qty: 60 TABLET | Refills: 0 | Status: SHIPPED | OUTPATIENT
Start: 2022-06-15

## 2022-06-15 ASSESSMENT — PATIENT HEALTH QUESTIONNAIRE - PHQ9
SUM OF ALL RESPONSES TO PHQ QUESTIONS 1-9: 0
SUM OF ALL RESPONSES TO PHQ QUESTIONS 1-9: 0
1. LITTLE INTEREST OR PLEASURE IN DOING THINGS: 0
SUM OF ALL RESPONSES TO PHQ QUESTIONS 1-9: 0
SUM OF ALL RESPONSES TO PHQ9 QUESTIONS 1 & 2: 0
SUM OF ALL RESPONSES TO PHQ QUESTIONS 1-9: 0
2. FEELING DOWN, DEPRESSED OR HOPELESS: 0

## 2022-06-15 ASSESSMENT — ENCOUNTER SYMPTOMS
VOMITING: 1
ABDOMINAL PAIN: 1
RESPIRATORY NEGATIVE: 1
NAUSEA: 1

## 2022-06-15 NOTE — PROGRESS NOTES
Therese Mayer (:  1965) is a 55 y.o. female,Established patient, here for evaluation of the following chief complaint(s):  Abdominal Pain (RUQ pain the past 2wks, at first only happened with eating, pain has gotten worse \"doubled over in pain\" the past 3days with vomiting), Chest Pain (chest pressure in the right chest), and Other (stool is more \"pasty\")        Subjective   SUBJECTIVE/OBJECTIVE:  HPI:    Chief Complaint   Patient presents with    Abdominal Pain     RUQ pain the past 2wks, at first only happened with eating, pain has gotten worse \"doubled over in pain\" the past 3days with vomiting    Chest Pain     chest pressure in the right chest    Other     stool is more \"pasty\"     Pt presents RIQ abdominal pain, nausea after eating for the last several weeks. Has had several episodes of vomiting after eating a piece of toast.      Also has some right-sided CP that is also brought on by food. Has taken TUMS with some relief. Hx of gastric bypass. BMs have never been normal since her surgery but now really pasty. Denies fevers. Wt Readings from Last 3 Encounters:   06/15/22 161 lb 11.2 oz (73.3 kg)   21 154 lb 8 oz (70.1 kg)   20 165 lb (74.8 kg)       Review of Systems   Constitutional: Negative. HENT: Negative. Respiratory: Negative. Cardiovascular: Positive for chest pain. Gastrointestinal: Positive for abdominal pain, nausea and vomiting. GERD   Musculoskeletal: Negative. All other systems reviewed and are negative. Objective   Physical Exam  Vitals and nursing note reviewed. Constitutional:       General: She is not in acute distress. Appearance: Normal appearance. She is well-developed. HENT:      Head: Normocephalic and atraumatic.       Right Ear: Tympanic membrane normal.      Left Ear: Tympanic membrane normal.   Eyes:      Conjunctiva/sclera: Conjunctivae normal.   Cardiovascular:      Rate and Rhythm: Normal rate and regular rhythm. Heart sounds: Normal heart sounds. No murmur heard. Pulmonary:      Effort: Pulmonary effort is normal.      Breath sounds: Normal breath sounds. No wheezing, rhonchi or rales. Abdominal:      General: Bowel sounds are normal. There is no distension. Palpations: There is no fluid wave. Tenderness: There is abdominal tenderness in the right upper quadrant. Positive signs include Fierro's sign. Musculoskeletal:      Cervical back: Neck supple. Skin:     General: Skin is warm and dry. Findings: No rash (on exposed surfaces). Neurological:      General: No focal deficit present. Mental Status: She is alert. Psychiatric:         Attention and Perception: Attention normal.         Mood and Affect: Mood normal.         Speech: Speech normal.         Behavior: Behavior normal. Behavior is cooperative. Thought Content: Thought content normal.         Judgment: Judgment normal.               ASSESSMENT/PLAN:  1. RUQ abdominal pain  -     US GALLBLADDER RUQ; Future  2. Nausea and vomiting, intractability of vomiting not specified, unspecified vomiting type  -     ondansetron (ZOFRAN ODT) 4 MG disintegrating tablet; Take 1 tablet by mouth every 8 hours as needed for Nausea or Vomiting, Disp-30 tablet, R-0Normal  -     US GALLBLADDER RUQ; Future  3. Gastroesophageal reflux disease, unspecified whether esophagitis present  -     famotidine (PEPCID) 20 MG tablet; Take 1 tablet by mouth 2 times daily, Disp-60 tablet, R-0Normal  4. Right-sided chest pain    -  Orders above  -  Further recommendations after US    Return if symptoms worsen or fail to improve. An electronic signature was used to authenticate this note.     --Josh Klein,

## 2022-07-11 ENCOUNTER — HOSPITAL ENCOUNTER (OUTPATIENT)
Dept: ULTRASOUND IMAGING | Age: 57
Discharge: HOME | End: 2022-07-11
Payer: COMMERCIAL

## 2022-07-11 DIAGNOSIS — R11.2 NAUSEA AND VOMITING, INTRACTABILITY OF VOMITING NOT SPECIFIED, UNSPECIFIED VOMITING TYPE: ICD-10-CM

## 2022-07-11 DIAGNOSIS — R10.11 RUQ ABDOMINAL PAIN: ICD-10-CM

## 2022-07-11 PROCEDURE — 76705 ECHO EXAM OF ABDOMEN: CPT

## 2022-08-09 DIAGNOSIS — K21.9 GASTROESOPHAGEAL REFLUX DISEASE, UNSPECIFIED WHETHER ESOPHAGITIS PRESENT: ICD-10-CM

## 2022-08-09 RX ORDER — RABEPRAZOLE SODIUM 20 MG/1
TABLET, DELAYED RELEASE ORAL
Qty: 90 TABLET | Refills: 0 | Status: SHIPPED | OUTPATIENT
Start: 2022-08-09

## 2022-09-21 ENCOUNTER — HOSPITAL ENCOUNTER (OUTPATIENT)
Age: 57
Discharge: HOME | End: 2022-09-21

## 2022-09-21 LAB
HBV SURFACE AB TITR SER: NORMAL {TITER}
RUBELLA: 202.8 IU/ML

## 2022-09-23 LAB
MUMPS IGG TITER: 1.62
VZV IGG SER QL IA: 3.37

## 2022-09-24 LAB — RUBEOLA IGG: 159 AU/ML

## 2022-11-09 ENCOUNTER — TELEMEDICINE (OUTPATIENT)
Dept: FAMILY MEDICINE CLINIC | Age: 57
End: 2022-11-09
Payer: COMMERCIAL

## 2022-11-09 DIAGNOSIS — K21.9 GASTROESOPHAGEAL REFLUX DISEASE, UNSPECIFIED WHETHER ESOPHAGITIS PRESENT: ICD-10-CM

## 2022-11-09 DIAGNOSIS — Z00.00 WELL ADULT HEALTH CHECK: Primary | ICD-10-CM

## 2022-11-09 DIAGNOSIS — Z78.0 POST-MENOPAUSAL: ICD-10-CM

## 2022-11-09 DIAGNOSIS — R11.0 NAUSEA: ICD-10-CM

## 2022-11-09 DIAGNOSIS — Z98.84 HISTORY OF ROUX-EN-Y GASTRIC BYPASS: ICD-10-CM

## 2022-11-09 PROCEDURE — 99396 PREV VISIT EST AGE 40-64: CPT | Performed by: FAMILY MEDICINE

## 2022-11-09 RX ORDER — RABEPRAZOLE SODIUM 20 MG/1
TABLET, DELAYED RELEASE ORAL
Qty: 90 TABLET | Refills: 3 | Status: SHIPPED | OUTPATIENT
Start: 2022-11-09

## 2022-11-09 RX ORDER — FAMOTIDINE 20 MG/1
20 TABLET, FILM COATED ORAL 2 TIMES DAILY
Qty: 180 TABLET | Refills: 3 | Status: SHIPPED | OUTPATIENT
Start: 2022-11-09

## 2022-11-09 RX ORDER — ONDANSETRON 4 MG/1
4 TABLET, ORALLY DISINTEGRATING ORAL EVERY 8 HOURS PRN
Qty: 90 TABLET | Refills: 0 | Status: SHIPPED | OUTPATIENT
Start: 2022-11-09

## 2022-11-10 ASSESSMENT — ENCOUNTER SYMPTOMS
RESPIRATORY NEGATIVE: 1
GASTROINTESTINAL NEGATIVE: 1

## 2022-11-10 NOTE — PROGRESS NOTES
Therese Mayer (:  1965) is a Established patient, here for evaluation of the following:         Subjective   HPI:    Chief Complaint   Patient presents with    Annual Exam     Annual eval.     Doing well, just needs refills. No concerns. Now working at [employer]. Patient Active Problem List   Diagnosis    GERD (gastroesophageal reflux disease)    Post-menopausal    History of Rahul-en-Y gastric bypass    DDD (degenerative disc disease), cervical    Chest pain syndrome    SOB (shortness of breath)     Past Surgical History:   Procedure Laterality Date    BREAST SURGERY      tumors    GASTRIC BYPASS SURGERY      [address] (CERVIX STATUS UNKNOWN)      OVARIAN CYST REMOVAL      multiple    TONSILLECTOMY      TOOTH EXTRACTION  2018    UPPER GASTROINTESTINAL ENDOSCOPY       Social History     Tobacco Use    Smoking status: Never    Smokeless tobacco: Never   Vaping Use    Vaping Use: Never used   Substance Use Topics    Alcohol use: Yes     Comment: occas    Drug use: No       Review of Systems   Constitutional: Negative. HENT: Negative. Respiratory: Negative. Cardiovascular: Negative. Gastrointestinal: Negative. Musculoskeletal: Negative. All other systems reviewed and are negative. Objective   Patient-Reported Vitals  No data recorded     Physical Exam  Constitutional:       General: She is not in acute distress. Appearance: Normal appearance. She is well-developed. She is not ill-appearing. HENT:      Head: Normocephalic and atraumatic. Right Ear: External ear normal.      Left Ear: External ear normal.   Eyes:      Conjunctiva/sclera: Conjunctivae normal.   Pulmonary:      Effort: Pulmonary effort is normal. No respiratory distress. Skin:     Findings: No rash (on exposed surfaces). Neurological:      Mental Status: She is alert and oriented to person, place, and time.    Psychiatric:         Mood and Affect: Mood normal.         Behavior: Behavior normal.         Thought Content: Thought content normal.         Judgment: Judgment normal.     Assessment & Plan   Below is the assessment and plan developed based on review of pertinent history, physical exam, labs, studies, and medications. 1. Well adult health check  2. Gastroesophageal reflux disease, unspecified whether esophagitis present  -     RABEprazole (ACIPHEX) 20 MG tablet; TAKE 1 TABLET BY MOUTH EVERY DAY, Disp-90 tablet, R-3Normal  -     famotidine (PEPCID) 20 MG tablet; Take 1 tablet by mouth 2 times daily, Disp-180 tablet, R-3Normal  3. Nausea  -     ondansetron (ZOFRAN ODT) 4 MG disintegrating tablet; Take 1 tablet by mouth every 8 hours as needed for Nausea or Vomiting, Disp-90 tablet, R-0Normal  4. History of Rahul-en-Y gastric bypass  5. Post-menopausal    -  Healthy lifestyle discussed  -  Chronic medical problems stable  -  Continue current medications  -  Follow up with specialists as scheduled  -  Declines preventive care at this time    Return in about 1 year (around 11/9/2023) for Wellness. Therese JANA Mayer, was evaluated through a synchronous (real-time) audio-video encounter. The patient (or guardian if applicable) is aware that this is a billable service, which includes applicable co-pays. This Virtual Visit was conducted with patient's (and/or legal guardian's) consent. The visit was conducted pursuant to the emergency declaration under the [facility], [facility] authority and the [employer] and [employer] General Act. Patient identification was verified, and a caregiver was present when appropriate. The patient was located at Home: [facility].  [address] [address]. Provider was located at [place] [address] (Appt Dept): [address]  [address] [facility],  [address]         --Kasey Mitchell DO

## 2023-01-07 DIAGNOSIS — R11.0 NAUSEA: ICD-10-CM

## 2023-01-09 RX ORDER — ONDANSETRON 4 MG/1
TABLET, ORALLY DISINTEGRATING ORAL
Qty: 90 TABLET | Refills: 11 | Status: SHIPPED | OUTPATIENT
Start: 2023-01-09

## 2023-07-24 SDOH — ECONOMIC STABILITY: FOOD INSECURITY: WITHIN THE PAST 12 MONTHS, THE FOOD YOU BOUGHT JUST DIDN'T LAST AND YOU DIDN'T HAVE MONEY TO GET MORE.: NEVER TRUE

## 2023-07-24 SDOH — ECONOMIC STABILITY: TRANSPORTATION INSECURITY
IN THE PAST 12 MONTHS, HAS LACK OF TRANSPORTATION KEPT YOU FROM MEETINGS, WORK, OR FROM GETTING THINGS NEEDED FOR DAILY LIVING?: NO

## 2023-07-24 SDOH — ECONOMIC STABILITY: FOOD INSECURITY: WITHIN THE PAST 12 MONTHS, YOU WORRIED THAT YOUR FOOD WOULD RUN OUT BEFORE YOU GOT MONEY TO BUY MORE.: NEVER TRUE

## 2023-07-24 SDOH — ECONOMIC STABILITY: INCOME INSECURITY: HOW HARD IS IT FOR YOU TO PAY FOR THE VERY BASICS LIKE FOOD, HOUSING, MEDICAL CARE, AND HEATING?: NOT HARD AT ALL

## 2023-07-24 SDOH — ECONOMIC STABILITY: HOUSING INSECURITY
IN THE LAST 12 MONTHS, WAS THERE A TIME WHEN YOU DID NOT HAVE A STEADY PLACE TO SLEEP OR SLEPT IN A SHELTER (INCLUDING NOW)?: NO

## 2023-07-24 ASSESSMENT — PATIENT HEALTH QUESTIONNAIRE - PHQ9
SUM OF ALL RESPONSES TO PHQ QUESTIONS 1-9: 0
SUM OF ALL RESPONSES TO PHQ QUESTIONS 1-9: 0
2. FEELING DOWN, DEPRESSED OR HOPELESS: 0
1. LITTLE INTEREST OR PLEASURE IN DOING THINGS: 0
SUM OF ALL RESPONSES TO PHQ9 QUESTIONS 1 & 2: 0
SUM OF ALL RESPONSES TO PHQ QUESTIONS 1-9: 0
SUM OF ALL RESPONSES TO PHQ QUESTIONS 1-9: 0
SUM OF ALL RESPONSES TO PHQ9 QUESTIONS 1 & 2: 0
1. LITTLE INTEREST OR PLEASURE IN DOING THINGS: NOT AT ALL
2. FEELING DOWN, DEPRESSED OR HOPELESS: NOT AT ALL

## 2023-07-25 ENCOUNTER — OFFICE VISIT (OUTPATIENT)
Dept: FAMILY MEDICINE CLINIC | Age: 58
End: 2023-07-25
Payer: COMMERCIAL

## 2023-07-25 VITALS
HEART RATE: 60 BPM | BODY MASS INDEX: 28.95 KG/M2 | RESPIRATION RATE: 16 BRPM | DIASTOLIC BLOOD PRESSURE: 70 MMHG | SYSTOLIC BLOOD PRESSURE: 124 MMHG | WEIGHT: 163.4 LBS | HEIGHT: 63 IN

## 2023-07-25 DIAGNOSIS — I34.1 MVP (MITRAL VALVE PROLAPSE): ICD-10-CM

## 2023-07-25 DIAGNOSIS — Z78.0 POST-MENOPAUSAL: ICD-10-CM

## 2023-07-25 DIAGNOSIS — Z98.84 HISTORY OF ROUX-EN-Y GASTRIC BYPASS: ICD-10-CM

## 2023-07-25 DIAGNOSIS — Z12.11 SCREEN FOR COLON CANCER: ICD-10-CM

## 2023-07-25 DIAGNOSIS — Z00.00 WELL ADULT HEALTH CHECK: Primary | ICD-10-CM

## 2023-07-25 DIAGNOSIS — K21.9 GASTROESOPHAGEAL REFLUX DISEASE, UNSPECIFIED WHETHER ESOPHAGITIS PRESENT: ICD-10-CM

## 2023-07-25 PROCEDURE — 99214 OFFICE O/P EST MOD 30 MIN: CPT | Performed by: FAMILY MEDICINE

## 2023-07-25 ASSESSMENT — ENCOUNTER SYMPTOMS
RESPIRATORY NEGATIVE: 1
GASTROINTESTINAL NEGATIVE: 1

## 2023-08-08 LAB — NONINV COLON CA DNA+OCC BLD SCRN STL QL: POSITIVE

## 2023-09-29 ENCOUNTER — COMMUNITY OUTREACH (OUTPATIENT)
Dept: FAMILY MEDICINE CLINIC | Age: 58
End: 2023-09-29

## 2023-10-15 DIAGNOSIS — K21.9 GASTROESOPHAGEAL REFLUX DISEASE, UNSPECIFIED WHETHER ESOPHAGITIS PRESENT: ICD-10-CM

## 2023-10-16 RX ORDER — RABEPRAZOLE SODIUM 20 MG/1
TABLET, DELAYED RELEASE ORAL
Qty: 90 TABLET | Refills: 0 | Status: SHIPPED | OUTPATIENT
Start: 2023-10-16

## 2023-10-17 DIAGNOSIS — K21.9 GASTROESOPHAGEAL REFLUX DISEASE, UNSPECIFIED WHETHER ESOPHAGITIS PRESENT: ICD-10-CM

## 2023-10-17 RX ORDER — FAMOTIDINE 20 MG/1
20 TABLET, FILM COATED ORAL 2 TIMES DAILY
Qty: 180 TABLET | Refills: 3 | Status: SHIPPED | OUTPATIENT
Start: 2023-10-17

## 2024-01-07 DIAGNOSIS — K21.9 GASTROESOPHAGEAL REFLUX DISEASE, UNSPECIFIED WHETHER ESOPHAGITIS PRESENT: ICD-10-CM

## 2024-01-08 RX ORDER — RABEPRAZOLE SODIUM 20 MG/1
TABLET, DELAYED RELEASE ORAL
Qty: 90 TABLET | Refills: 0 | Status: SHIPPED | OUTPATIENT
Start: 2024-01-08

## 2024-01-09 ENCOUNTER — TELEPHONE (OUTPATIENT)
Dept: CARDIOLOGY CLINIC | Age: 59
End: 2024-01-09

## 2024-01-09 NOTE — TELEPHONE ENCOUNTER
Received a message patient would like to see Dr. López, former patient of Dr. Corea-although I don't see any previous appointments with Dr. Corea?  I called patient and LM to schedule with Dr. López.

## 2024-01-10 NOTE — TELEPHONE ENCOUNTER
Unable to reach patient.   I called HIPAA contact Claudio and LIZZETTE to have Therese call us back.

## 2024-01-15 NOTE — TELEPHONE ENCOUNTER
Patient replied to [place] [facility] message:    Therese Mayer   to [facility] Clinical Staff (supporting You)         1/11/24  6:20 PM  Thanks Ori so sorry I work from 5am to 430pm I will call and get an appt set up as soon as possible   Thank you so much.  Therese Mayer

## 2024-04-04 DIAGNOSIS — R11.0 NAUSEA: ICD-10-CM

## 2024-04-04 RX ORDER — ONDANSETRON 4 MG/1
TABLET, ORALLY DISINTEGRATING ORAL
Qty: 90 TABLET | Refills: 0 | Status: SHIPPED | OUTPATIENT
Start: 2024-04-04

## 2024-04-20 DIAGNOSIS — K21.9 GASTROESOPHAGEAL REFLUX DISEASE, UNSPECIFIED WHETHER ESOPHAGITIS PRESENT: ICD-10-CM

## 2024-04-22 RX ORDER — RABEPRAZOLE SODIUM 20 MG/1
TABLET, DELAYED RELEASE ORAL
Qty: 90 TABLET | Refills: 0 | Status: SHIPPED | OUTPATIENT
Start: 2024-04-22

## 2024-06-25 ASSESSMENT — PATIENT HEALTH QUESTIONNAIRE - PHQ9
1. LITTLE INTEREST OR PLEASURE IN DOING THINGS: NOT AT ALL
SUM OF ALL RESPONSES TO PHQ QUESTIONS 1-9: 0
2. FEELING DOWN, DEPRESSED OR HOPELESS: NOT AT ALL
SUM OF ALL RESPONSES TO PHQ QUESTIONS 1-9: 0
SUM OF ALL RESPONSES TO PHQ9 QUESTIONS 1 & 2: 0
SUM OF ALL RESPONSES TO PHQ QUESTIONS 1-9: 0
1. LITTLE INTEREST OR PLEASURE IN DOING THINGS: NOT AT ALL
2. FEELING DOWN, DEPRESSED OR HOPELESS: NOT AT ALL
SUM OF ALL RESPONSES TO PHQ QUESTIONS 1-9: 0
SUM OF ALL RESPONSES TO PHQ9 QUESTIONS 1 & 2: 0

## 2024-06-26 ENCOUNTER — OFFICE VISIT (OUTPATIENT)
Dept: FAMILY MEDICINE CLINIC | Age: 59
End: 2024-06-26
Payer: COMMERCIAL

## 2024-06-26 VITALS
RESPIRATION RATE: 12 BRPM | WEIGHT: 165.1 LBS | BODY MASS INDEX: 28.19 KG/M2 | DIASTOLIC BLOOD PRESSURE: 78 MMHG | HEIGHT: 64 IN | SYSTOLIC BLOOD PRESSURE: 130 MMHG | HEART RATE: 56 BPM

## 2024-06-26 DIAGNOSIS — Z98.84 HISTORY OF ROUX-EN-Y GASTRIC BYPASS: ICD-10-CM

## 2024-06-26 DIAGNOSIS — R11.0 NAUSEA: ICD-10-CM

## 2024-06-26 DIAGNOSIS — R19.5 POSITIVE COLORECTAL CANCER SCREENING USING COLOGUARD TEST: ICD-10-CM

## 2024-06-26 DIAGNOSIS — E66.3 OVERWEIGHT (BMI 25.0-29.9): ICD-10-CM

## 2024-06-26 DIAGNOSIS — Z12.31 ENCOUNTER FOR SCREENING MAMMOGRAM FOR MALIGNANT NEOPLASM OF BREAST: ICD-10-CM

## 2024-06-26 DIAGNOSIS — I34.1 MVP (MITRAL VALVE PROLAPSE): ICD-10-CM

## 2024-06-26 DIAGNOSIS — K21.9 GASTROESOPHAGEAL REFLUX DISEASE, UNSPECIFIED WHETHER ESOPHAGITIS PRESENT: ICD-10-CM

## 2024-06-26 DIAGNOSIS — Z78.0 POST-MENOPAUSAL: ICD-10-CM

## 2024-06-26 DIAGNOSIS — Z00.00 WELL ADULT HEALTH CHECK: Primary | ICD-10-CM

## 2024-06-26 PROCEDURE — 99396 PREV VISIT EST AGE 40-64: CPT | Performed by: FAMILY MEDICINE

## 2024-06-26 RX ORDER — RABEPRAZOLE SODIUM 20 MG/1
20 TABLET, DELAYED RELEASE ORAL DAILY
Qty: 90 TABLET | Refills: 3 | Status: SHIPPED | OUTPATIENT
Start: 2024-06-26

## 2024-06-26 RX ORDER — PHENTERMINE HYDROCHLORIDE 37.5 MG/1
37.5 TABLET ORAL
Qty: 30 TABLET | Refills: 0 | Status: SHIPPED | OUTPATIENT
Start: 2024-06-26 | End: 2024-07-26

## 2024-06-26 RX ORDER — ONDANSETRON 4 MG/1
TABLET, ORALLY DISINTEGRATING ORAL
Qty: 90 TABLET | Refills: 0 | Status: SHIPPED | OUTPATIENT
Start: 2024-06-26

## 2024-06-26 ASSESSMENT — ENCOUNTER SYMPTOMS
RESPIRATORY NEGATIVE: 1
GASTROINTESTINAL NEGATIVE: 1

## 2024-06-26 NOTE — PROGRESS NOTES
2024    Therese Mayer (:  1965) is a 58 y.o. female, here for a preventive medicine evaluation.  Chief Complaint   Patient presents with    Annual Exam    Discuss Medications     Adipex use     Annual eval.    Requesting to start Adipex.  Did well on it in the past.  Would like to lose 15 lbs.      BP Readings from Last 3 Encounters:   24 130/78   23 124/70   06/15/22 (!) 146/76     Wt Readings from Last 3 Encounters:   24 74.9 kg (165 lb 1.6 oz)   23 74.1 kg (163 lb 6.4 oz)   06/15/22 73.3 kg (161 lb 11.2 oz)     Chronic conditions stable.    Subjective   Patient Active Problem List   Diagnosis    GERD (gastroesophageal reflux disease)    Post-menopausal    History of Rahul-en-Y gastric bypass    DDD (degenerative disc disease), cervical    Chest pain syndrome    SOB (shortness of breath)       Review of Systems   Constitutional: Negative.    HENT: Negative.     Respiratory: Negative.     Cardiovascular: Negative.    Gastrointestinal: Negative.    Musculoskeletal: Negative.    All other systems reviewed and are negative.      Prior to Visit Medications    Medication Sig Taking? Authorizing Provider   metoprolol tartrate (LOPRESSOR) 25 MG tablet Take 1 tablet by mouth 2 times daily Yes Yordan Rapp DO   ondansetron (ZOFRAN-ODT) 4 MG disintegrating tablet PLACE 1 TABLET ON THE TONGUE EVERY 8 HOURS AS NEEDED FOR NAUSEA OR VOMITING Yes Yordan Rapp DO   RABEprazole (ACIPHEX) 20 MG tablet Take 1 tablet by mouth daily Yes Yordan Rapp DO   phentermine (ADIPEX-P) 37.5 MG tablet Take 1 tablet by mouth every morning (before breakfast) for 30 days. Max Daily Amount: 37.5 mg Yes Yordan Rapp DO   famotidine (PEPCID) 20 MG tablet TAKE 1 TABLET TWICE A DAY Yes Yordan Rapp DO   Cholecalciferol (VITAMIN D-3 PO) Take 5,000 Int'l Units/day by mouth Yes Provider, MD Matteo   aspirin 81 MG chewable tablet Take 1 tablet by mouth daily Yes

## 2024-07-09 ENCOUNTER — NURSE ONLY (OUTPATIENT)
Dept: LAB | Age: 59
End: 2024-07-09

## 2024-07-09 ENCOUNTER — TELEMEDICINE (OUTPATIENT)
Dept: FAMILY MEDICINE CLINIC | Age: 59
End: 2024-07-09
Payer: COMMERCIAL

## 2024-07-09 DIAGNOSIS — K21.9 GASTROESOPHAGEAL REFLUX DISEASE, UNSPECIFIED WHETHER ESOPHAGITIS PRESENT: ICD-10-CM

## 2024-07-09 DIAGNOSIS — R19.5 POSITIVE COLORECTAL CANCER SCREENING USING COLOGUARD TEST: ICD-10-CM

## 2024-07-09 DIAGNOSIS — D50.9 MICROCYTIC ANEMIA: Primary | ICD-10-CM

## 2024-07-09 DIAGNOSIS — Z00.00 WELL ADULT HEALTH CHECK: ICD-10-CM

## 2024-07-09 DIAGNOSIS — Z98.84 HISTORY OF ROUX-EN-Y GASTRIC BYPASS: ICD-10-CM

## 2024-07-09 LAB
ALBUMIN SERPL BCG-MCNC: 4.1 G/DL (ref 3.5–5.1)
ALP SERPL-CCNC: 70 U/L (ref 38–126)
ALT SERPL W/O P-5'-P-CCNC: 14 U/L (ref 11–66)
ANION GAP SERPL CALC-SCNC: 9 MEQ/L (ref 8–16)
AST SERPL-CCNC: 17 U/L (ref 5–40)
BASOPHILS ABSOLUTE: 0 THOU/MM3 (ref 0–0.1)
BASOPHILS NFR BLD AUTO: 0.5 %
BILIRUB SERPL-MCNC: 0.2 MG/DL (ref 0.3–1.2)
BUN SERPL-MCNC: 7 MG/DL (ref 7–22)
CALCIUM SERPL-MCNC: 8.9 MG/DL (ref 8.5–10.5)
CHLORIDE SERPL-SCNC: 103 MEQ/L (ref 98–111)
CHOLEST SERPL-MCNC: 166 MG/DL (ref 100–199)
CO2 SERPL-SCNC: 28 MEQ/L (ref 23–33)
CREAT SERPL-MCNC: 0.7 MG/DL (ref 0.4–1.2)
DEPRECATED MEAN GLUCOSE BLD GHB EST-ACNC: 84 MG/DL (ref 70–126)
DEPRECATED RDW RBC AUTO: 49 FL (ref 35–45)
EOSINOPHIL NFR BLD AUTO: 2.5 %
EOSINOPHILS ABSOLUTE: 0.1 THOU/MM3 (ref 0–0.4)
ERYTHROCYTE [DISTWIDTH] IN BLOOD BY AUTOMATED COUNT: 19.1 % (ref 11.5–14.5)
GFR SERPL CREATININE-BSD FRML MDRD: > 90 ML/MIN/1.73M2
GLUCOSE SERPL-MCNC: 94 MG/DL (ref 70–108)
HBA1C MFR BLD HPLC: 4.8 % (ref 4.4–6.4)
HCT VFR BLD AUTO: 28.9 % (ref 37–47)
HDLC SERPL-MCNC: 75 MG/DL
HGB BLD-MCNC: 8.3 GM/DL (ref 12–16)
HYPOCHROMIA BLD QL SMEAR: PRESENT
IMM GRANULOCYTES # BLD AUTO: 0.02 THOU/MM3 (ref 0–0.07)
IMM GRANULOCYTES NFR BLD AUTO: 0.4 %
LDLC SERPL CALC-MCNC: 72 MG/DL
LYMPHOCYTES ABSOLUTE: 1.8 THOU/MM3 (ref 1–4.8)
LYMPHOCYTES NFR BLD AUTO: 32.5 %
MCH RBC QN AUTO: 20.5 PG (ref 26–33)
MCHC RBC AUTO-ENTMCNC: 28.7 GM/DL (ref 32.2–35.5)
MCV RBC AUTO: 71.5 FL (ref 81–99)
MONOCYTES ABSOLUTE: 0.5 THOU/MM3 (ref 0.4–1.3)
MONOCYTES NFR BLD AUTO: 9.1 %
NEUTROPHILS ABSOLUTE: 3 THOU/MM3 (ref 1.8–7.7)
NEUTROPHILS NFR BLD AUTO: 55 %
NRBC BLD AUTO-RTO: 0 /100 WBC
PLATELET # BLD AUTO: 342 THOU/MM3 (ref 130–400)
PLATELET BLD QL SMEAR: ADEQUATE
PMV BLD AUTO: 9.6 FL (ref 9.4–12.4)
POTASSIUM SERPL-SCNC: 4.5 MEQ/L (ref 3.5–5.2)
PROT SERPL-MCNC: 6.7 G/DL (ref 6.1–8)
RBC # BLD AUTO: 4.04 MILL/MM3 (ref 4.2–5.4)
SCAN OF BLOOD SMEAR: NORMAL
SODIUM SERPL-SCNC: 140 MEQ/L (ref 135–145)
TARGETS BLD QL SMEAR: ABNORMAL
TRIGL SERPL-MCNC: 95 MG/DL (ref 0–199)
TSH SERPL DL<=0.005 MIU/L-ACNC: 1.21 UIU/ML (ref 0.4–4.2)
WBC # BLD AUTO: 5.5 THOU/MM3 (ref 4.8–10.8)

## 2024-07-09 PROCEDURE — 99214 OFFICE O/P EST MOD 30 MIN: CPT | Performed by: FAMILY MEDICINE

## 2024-07-09 PROCEDURE — G2211 COMPLEX E/M VISIT ADD ON: HCPCS | Performed by: FAMILY MEDICINE

## 2024-07-09 NOTE — PROGRESS NOTES
Therese Mayer (:  1965) is a Established patient, here for evaluation of the following:    Subjective   HPI:    Chief Complaint   Patient presents with    Abnormal Lab     Pt presents to review recent labs.    H&H low.  Denies symptoms.  Denies signs of bleeding.  Lab Results   Component Value Date    WBC 5.5 2024    HGB 8.3 (L) 2024    HCT 28.9 (L) 2024    MCV 71.5 (L) 2024     2024     + Cologuard , lost to follow up.    Plans to call GI to schedule her appt today.    Patient Active Problem List   Diagnosis    GERD (gastroesophageal reflux disease)    Post-menopausal    History of Rahul-en-Y gastric bypass    DDD (degenerative disc disease), cervical    Chest pain syndrome    SOB (shortness of breath)     Past Surgical History:   Procedure Laterality Date    BREAST SURGERY      tumors    GASTRIC BYPASS SURGERY  2006    HERNIA REPAIR  1969    HYSTERECTOMY (CERVIX STATUS UNKNOWN)      OVARIAN CYST REMOVAL      multiple    TONSILLECTOMY      TOOTH EXTRACTION  2018    UPPER GASTROINTESTINAL ENDOSCOPY       Social History     Tobacco Use    Smoking status: Never     Passive exposure: Never    Smokeless tobacco: Never   Vaping Use    Vaping Use: Never used   Substance Use Topics    Alcohol use: Yes     Comment: occas    Drug use: No       Review of Systems   Constitutional: Negative.  Negative for unexpected weight change.   HENT: Negative.     Respiratory: Negative.     Cardiovascular: Negative.    Gastrointestinal: Negative.  Negative for abdominal pain and blood in stool.   Musculoskeletal: Negative.    All other systems reviewed and are negative.         Objective   Patient-Reported Vitals  No data recorded     Physical Exam  Constitutional:       General: She is not in acute distress.     Appearance: Normal appearance. She is well-developed. She is not ill-appearing.   HENT:      Head: Normocephalic and atraumatic.      Right Ear: External ear normal.      Left Ear:

## 2024-07-10 ASSESSMENT — ENCOUNTER SYMPTOMS
ABDOMINAL PAIN: 0
GASTROINTESTINAL NEGATIVE: 1
BLOOD IN STOOL: 0
RESPIRATORY NEGATIVE: 1

## 2024-07-15 ENCOUNTER — LAB (OUTPATIENT)
Dept: LAB | Age: 59
End: 2024-07-15

## 2024-07-15 DIAGNOSIS — D50.9 MICROCYTIC ANEMIA: ICD-10-CM

## 2024-07-15 DIAGNOSIS — D50.9 IRON DEFICIENCY ANEMIA, UNSPECIFIED IRON DEFICIENCY ANEMIA TYPE: Primary | ICD-10-CM

## 2024-07-15 LAB
BASOPHILS ABSOLUTE: 0 THOU/MM3 (ref 0–0.1)
BASOPHILS NFR BLD AUTO: 0.5 %
DEPRECATED RDW RBC AUTO: 47.8 FL (ref 35–45)
EOSINOPHIL NFR BLD AUTO: 1.3 %
EOSINOPHILS ABSOLUTE: 0.1 THOU/MM3 (ref 0–0.4)
ERYTHROCYTE [DISTWIDTH] IN BLOOD BY AUTOMATED COUNT: 19 % (ref 11.5–14.5)
FERRITIN SERPL IA-MCNC: 6 NG/ML (ref 10–291)
HCT VFR BLD AUTO: 27.8 % (ref 37–47)
HGB BLD-MCNC: 7.8 GM/DL (ref 12–16)
HYPOCHROMIA BLD QL SMEAR: PRESENT
IMM GRANULOCYTES # BLD AUTO: 0.01 THOU/MM3 (ref 0–0.07)
IMM GRANULOCYTES NFR BLD AUTO: 0.2 %
IRON SATN MFR SERPL: 4 % (ref 20–50)
IRON SERPL-MCNC: 21 UG/DL (ref 50–170)
LYMPHOCYTES ABSOLUTE: 1.8 THOU/MM3 (ref 1–4.8)
LYMPHOCYTES NFR BLD AUTO: 32.6 %
MCH RBC QN AUTO: 19.7 PG (ref 26–33)
MCHC RBC AUTO-ENTMCNC: 28.1 GM/DL (ref 32.2–35.5)
MCV RBC AUTO: 70.4 FL (ref 81–99)
MONOCYTES ABSOLUTE: 0.5 THOU/MM3 (ref 0.4–1.3)
MONOCYTES NFR BLD AUTO: 8.6 %
NEUTROPHILS ABSOLUTE: 3.1 THOU/MM3 (ref 1.8–7.7)
NEUTROPHILS NFR BLD AUTO: 56.8 %
NRBC BLD AUTO-RTO: 0 /100 WBC
PLATELET # BLD AUTO: 358 THOU/MM3 (ref 130–400)
PLATELET BLD QL SMEAR: ADEQUATE
PMV BLD AUTO: 10 FL (ref 9.4–12.4)
RBC # BLD AUTO: 3.95 MILL/MM3 (ref 4.2–5.4)
SCAN OF BLOOD SMEAR: NORMAL
TARGETS BLD QL SMEAR: ABNORMAL
TIBC SERPL-MCNC: 468 UG/DL (ref 171–450)
WBC # BLD AUTO: 5.5 THOU/MM3 (ref 4.8–10.8)

## 2024-07-24 ENCOUNTER — OFFICE VISIT (OUTPATIENT)
Dept: FAMILY MEDICINE CLINIC | Age: 59
End: 2024-07-24
Payer: COMMERCIAL

## 2024-07-24 VITALS
BODY MASS INDEX: 27.45 KG/M2 | SYSTOLIC BLOOD PRESSURE: 114 MMHG | WEIGHT: 160.8 LBS | DIASTOLIC BLOOD PRESSURE: 70 MMHG | HEART RATE: 60 BPM | RESPIRATION RATE: 16 BRPM | HEIGHT: 64 IN

## 2024-07-24 DIAGNOSIS — D50.9 MICROCYTIC ANEMIA: Primary | ICD-10-CM

## 2024-07-24 DIAGNOSIS — R11.0 NAUSEA: ICD-10-CM

## 2024-07-24 DIAGNOSIS — Z98.84 HISTORY OF ROUX-EN-Y GASTRIC BYPASS: ICD-10-CM

## 2024-07-24 DIAGNOSIS — R19.5 POSITIVE COLORECTAL CANCER SCREENING USING COLOGUARD TEST: ICD-10-CM

## 2024-07-24 DIAGNOSIS — E66.3 OVERWEIGHT (BMI 25.0-29.9): ICD-10-CM

## 2024-07-24 PROBLEM — M67.911 TENDINOPATHY OF RIGHT ROTATOR CUFF: Status: ACTIVE | Noted: 2024-07-24

## 2024-07-24 PROCEDURE — 99214 OFFICE O/P EST MOD 30 MIN: CPT | Performed by: FAMILY MEDICINE

## 2024-07-24 PROCEDURE — G2211 COMPLEX E/M VISIT ADD ON: HCPCS | Performed by: FAMILY MEDICINE

## 2024-07-24 RX ORDER — ONDANSETRON 4 MG/1
TABLET, ORALLY DISINTEGRATING ORAL
Qty: 270 TABLET | Refills: 0 | Status: SHIPPED | OUTPATIENT
Start: 2024-07-24

## 2024-07-24 RX ORDER — ONDANSETRON 4 MG/1
TABLET, ORALLY DISINTEGRATING ORAL
Qty: 90 TABLET | Refills: 0 | Status: SHIPPED | OUTPATIENT
Start: 2024-07-24 | End: 2024-07-24

## 2024-07-24 RX ORDER — PHENTERMINE HYDROCHLORIDE 37.5 MG/1
37.5 TABLET ORAL
Qty: 30 TABLET | Refills: 0 | Status: SHIPPED | OUTPATIENT
Start: 2024-07-24 | End: 2024-08-23

## 2024-07-24 ASSESSMENT — ENCOUNTER SYMPTOMS
NAUSEA: 1
RESPIRATORY NEGATIVE: 1

## 2024-07-24 NOTE — PROGRESS NOTES
Positive colorectal cancer screening using Cologuard test  5. Overweight (BMI 25.0-29.9)  -     phentermine (ADIPEX-P) 37.5 MG tablet; Take 1 tablet by mouth every morning (before breakfast) for 30 days. Max Daily Amount: 37.5 mg, Disp-30 tablet, R-0Normal    -  Labs reviewed, abnormalities addressed  -  Heme appt on the 26th, iron infusions likely  -  Follow with GI in a few weeks  -  Down 5 lbs after month #1 of Adipex, refill sent    Return in about 4 weeks (around 8/21/2024) for Adipex follow up.             An electronic signature was used to authenticate this note.    --Yordan Rapp, DO

## 2024-07-25 ENCOUNTER — NURSE ONLY (OUTPATIENT)
Dept: FAMILY MEDICINE CLINIC | Age: 59
End: 2024-07-25
Payer: COMMERCIAL

## 2024-07-25 DIAGNOSIS — Z23 NEED FOR TDAP VACCINATION: Primary | ICD-10-CM

## 2024-07-25 PROCEDURE — 90715 TDAP VACCINE 7 YRS/> IM: CPT | Performed by: FAMILY MEDICINE

## 2024-07-25 PROCEDURE — 99999 PR OFFICE/OUTPT VISIT,PROCEDURE ONLY: CPT | Performed by: FAMILY MEDICINE

## 2024-07-25 PROCEDURE — 90471 IMMUNIZATION ADMIN: CPT | Performed by: FAMILY MEDICINE

## 2024-07-25 NOTE — PROGRESS NOTES
Immunizations Administered       Name Date Dose Route    TDaP, ADACEL (age 10y-64y), BOOSTRIX (age 10y+), IM, 0.5mL 7/25/2024 0.5 mL Intramuscular    Site: Deltoid- Left    Lot: [id number]    NDC: [address]-6             After obtaining consent, and per orders of Dr. Rapp, injection of Boostrix 0.5ml given IM in Left deltoid by NEYDA BOND LPN. Patient instructed to report any adverse reaction to me immediately.       Patient tolerated well and without incident.    VIS given to the patient, ABN signed.

## 2024-07-26 ENCOUNTER — OFFICE VISIT (OUTPATIENT)
Dept: ONCOLOGY | Age: 59
End: 2024-07-26
Payer: COMMERCIAL

## 2024-07-26 ENCOUNTER — HOSPITAL ENCOUNTER (OUTPATIENT)
Dept: INFUSION THERAPY | Age: 59
Discharge: HOME | End: 2024-07-26
Payer: COMMERCIAL

## 2024-07-26 VITALS
HEART RATE: 51 BPM | TEMPERATURE: 97.5 F | DIASTOLIC BLOOD PRESSURE: 68 MMHG | SYSTOLIC BLOOD PRESSURE: 134 MMHG | RESPIRATION RATE: 18 BRPM

## 2024-07-26 VITALS
OXYGEN SATURATION: 100 % | HEIGHT: 64 IN | TEMPERATURE: 97.5 F | DIASTOLIC BLOOD PRESSURE: 68 MMHG | WEIGHT: 158 LBS | BODY MASS INDEX: 26.98 KG/M2 | HEART RATE: 51 BPM | SYSTOLIC BLOOD PRESSURE: 134 MMHG | RESPIRATION RATE: 18 BRPM

## 2024-07-26 DIAGNOSIS — D50.0 IRON DEFICIENCY ANEMIA SECONDARY TO BLOOD LOSS (CHRONIC): ICD-10-CM

## 2024-07-26 DIAGNOSIS — K91.89 IRON DEFICIENCY ANEMIA AFTER GASTRECTOMY: ICD-10-CM

## 2024-07-26 DIAGNOSIS — K91.89 IRON DEFICIENCY ANEMIA AFTER GASTRECTOMY: Primary | ICD-10-CM

## 2024-07-26 DIAGNOSIS — D50.8 IRON DEFICIENCY ANEMIA AFTER GASTRECTOMY: ICD-10-CM

## 2024-07-26 DIAGNOSIS — D50.8 IRON DEFICIENCY ANEMIA AFTER GASTRECTOMY: Primary | ICD-10-CM

## 2024-07-26 LAB
BASOPHILS ABSOLUTE: 0 THOU/MM3 (ref 0–0.1)
BASOPHILS NFR BLD AUTO: 1 % (ref 0–3)
EOSINOPHIL NFR BLD AUTO: 2 % (ref 0–4)
EOSINOPHILS ABSOLUTE: 0.1 THOU/MM3 (ref 0–0.4)
ERYTHROCYTE [DISTWIDTH] IN BLOOD BY AUTOMATED COUNT: 19 % (ref 11.5–14.5)
HCT VFR BLD AUTO: 28.1 % (ref 37–47)
HGB BLD-MCNC: 8.1 GM/DL (ref 12–16)
IMMATURE GRANULOCYTES %: 0 %
IMMATURE GRANULOCYTES ABSOLUTE: 0 THOU/MM3 (ref 0–0.07)
LYMPHOCYTES ABSOLUTE: 1.6 THOU/MM3 (ref 1–4.8)
LYMPHOCYTES NFR BLD AUTO: 30 % (ref 15–47)
MCH RBC QN AUTO: 20 PG (ref 26–33)
MCHC RBC AUTO-ENTMCNC: 28.8 GM/DL (ref 32.2–35.5)
MCV RBC AUTO: 70 FL (ref 81–99)
MONOCYTES ABSOLUTE: 0.6 THOU/MM3 (ref 0.4–1.3)
MONOCYTES NFR BLD AUTO: 11 % (ref 0–12)
NEUTROPHILS ABSOLUTE: 3 THOU/MM3 (ref 1.8–7.7)
NEUTROPHILS NFR BLD AUTO: 57 % (ref 43–75)
PLATELET # BLD AUTO: 319 THOU/MM3 (ref 130–400)
PMV BLD AUTO: 8.6 FL (ref 9.4–12.4)
RBC # BLD AUTO: 4.04 MILL/MM3 (ref 4.2–5.4)
WBC # BLD AUTO: 5.3 THOU/MM3 (ref 4.8–10.8)

## 2024-07-26 PROCEDURE — 85025 COMPLETE CBC W/AUTO DIFF WBC: CPT

## 2024-07-26 PROCEDURE — 99211 OFF/OP EST MAY X REQ PHY/QHP: CPT

## 2024-07-26 PROCEDURE — 36415 COLL VENOUS BLD VENIPUNCTURE: CPT

## 2024-07-26 PROCEDURE — 99204 OFFICE O/P NEW MOD 45 MIN: CPT | Performed by: NURSE PRACTITIONER

## 2024-07-26 RX ORDER — FAMOTIDINE 10 MG/ML
20 INJECTION, SOLUTION INTRAVENOUS
Status: CANCELLED | OUTPATIENT
Start: 2024-07-26

## 2024-07-26 RX ORDER — ACETAMINOPHEN 325 MG/1
650 TABLET ORAL
Status: CANCELLED | OUTPATIENT
Start: 2024-07-26

## 2024-07-26 RX ORDER — SODIUM CHLORIDE 0.9 % (FLUSH) 0.9 %
5-40 SYRINGE (ML) INJECTION PRN
Status: CANCELLED | OUTPATIENT
Start: 2024-07-26

## 2024-07-26 RX ORDER — SODIUM CHLORIDE 9 MG/ML
5-250 INJECTION, SOLUTION INTRAVENOUS PRN
Status: CANCELLED | OUTPATIENT
Start: 2024-07-26

## 2024-07-26 RX ORDER — DIPHENHYDRAMINE HYDROCHLORIDE 50 MG/ML
50 INJECTION INTRAMUSCULAR; INTRAVENOUS
Status: CANCELLED | OUTPATIENT
Start: 2024-07-26

## 2024-07-26 RX ORDER — EPINEPHRINE 1 MG/ML
0.3 INJECTION, SOLUTION, CONCENTRATE INTRAVENOUS PRN
Status: CANCELLED | OUTPATIENT
Start: 2024-07-26

## 2024-07-26 RX ORDER — HEPARIN SODIUM (PORCINE) LOCK FLUSH IV SOLN 100 UNIT/ML 100 UNIT/ML
500 SOLUTION INTRAVENOUS PRN
Status: CANCELLED | OUTPATIENT
Start: 2024-07-26

## 2024-07-26 RX ORDER — ONDANSETRON 2 MG/ML
8 INJECTION INTRAMUSCULAR; INTRAVENOUS
Status: CANCELLED | OUTPATIENT
Start: 2024-07-26

## 2024-07-26 RX ORDER — SODIUM CHLORIDE 9 MG/ML
INJECTION, SOLUTION INTRAVENOUS CONTINUOUS
Status: CANCELLED | OUTPATIENT
Start: 2024-07-26

## 2024-07-26 RX ORDER — ALBUTEROL SULFATE 90 UG/1
4 AEROSOL, METERED RESPIRATORY (INHALATION) PRN
Status: CANCELLED | OUTPATIENT
Start: 2024-07-26

## 2024-07-26 NOTE — PROGRESS NOTES
appointment.    2. Iron deficiency anemia secondary to blood loss (chronic)  Venofer treatments, will call to schedule  CBC today. If critical, recommend ER.   Keep GI appointment.    No follow-ups on file.       All patient questions answered. Pt voiced understanding. Patient agreed with treatment plan. Follow up as directed. Patient instructed to call for questions or concerns.      40 minutes on chart prep, review of labs and face-to-face with the patient. Greater than 50% of time was spent on counseling and coordinating care.   All patient questions answered. Pt voiced understanding. Patient agreed with treatment plan. Follow up as directed. Patient instructed to call for questions or concerns.       Electronically signed by ELLIE Oshea CNP on 8/1/2024 at 8:28 AM     NOTE:  This report was transcribed using voice recognition software. Every effort  was made to ensure accuracy; however, inadvertent computerized transcription  errors may be present.

## 2024-07-26 NOTE — PATIENT INSTRUCTIONS
Venofer treatments, will call to schedule  CBC today. If critical, recommend ER.   Keep GI appointment.

## 2024-07-30 ENCOUNTER — HOSPITAL ENCOUNTER (OUTPATIENT)
Dept: INFUSION THERAPY | Age: 59
Discharge: HOME | End: 2024-07-30
Payer: COMMERCIAL

## 2024-07-30 VITALS
BODY MASS INDEX: 28.42 KG/M2 | RESPIRATION RATE: 18 BRPM | WEIGHT: 160.4 LBS | DIASTOLIC BLOOD PRESSURE: 84 MMHG | OXYGEN SATURATION: 100 % | HEIGHT: 63 IN | HEART RATE: 55 BPM | SYSTOLIC BLOOD PRESSURE: 136 MMHG | TEMPERATURE: 97.7 F

## 2024-07-30 DIAGNOSIS — D50.0 IRON DEFICIENCY ANEMIA SECONDARY TO BLOOD LOSS (CHRONIC): Primary | ICD-10-CM

## 2024-07-30 DIAGNOSIS — D50.8 IRON DEFICIENCY ANEMIA AFTER GASTRECTOMY: ICD-10-CM

## 2024-07-30 DIAGNOSIS — K91.89 IRON DEFICIENCY ANEMIA AFTER GASTRECTOMY: ICD-10-CM

## 2024-07-30 DIAGNOSIS — Z98.84 HISTORY OF ROUX-EN-Y GASTRIC BYPASS: ICD-10-CM

## 2024-07-30 PROCEDURE — 99211 OFF/OP EST MAY X REQ PHY/QHP: CPT

## 2024-07-30 PROCEDURE — 96365 THER/PROPH/DIAG IV INF INIT: CPT

## 2024-07-30 PROCEDURE — 6360000002 HC RX W HCPCS: Performed by: NURSE PRACTITIONER

## 2024-07-30 PROCEDURE — 2580000003 HC RX 258: Performed by: NURSE PRACTITIONER

## 2024-07-30 PROCEDURE — 96401 CHEMO ANTI-NEOPL SQ/IM: CPT

## 2024-07-30 PROCEDURE — 96366 THER/PROPH/DIAG IV INF ADDON: CPT

## 2024-07-30 RX ORDER — HEPARIN 100 UNIT/ML
500 SYRINGE INTRAVENOUS PRN
OUTPATIENT
Start: 2024-08-06

## 2024-07-30 RX ORDER — DIPHENHYDRAMINE HYDROCHLORIDE 50 MG/ML
50 INJECTION INTRAMUSCULAR; INTRAVENOUS
OUTPATIENT
Start: 2024-08-06

## 2024-07-30 RX ORDER — SODIUM CHLORIDE 9 MG/ML
5-250 INJECTION, SOLUTION INTRAVENOUS PRN
Status: DISCONTINUED | OUTPATIENT
Start: 2024-07-30 | End: 2024-07-31 | Stop reason: HOSPADM

## 2024-07-30 RX ORDER — SODIUM CHLORIDE 9 MG/ML
5-250 INJECTION, SOLUTION INTRAVENOUS PRN
OUTPATIENT
Start: 2024-08-06

## 2024-07-30 RX ORDER — ALBUTEROL SULFATE 90 UG/1
4 AEROSOL, METERED RESPIRATORY (INHALATION) PRN
OUTPATIENT
Start: 2024-08-06

## 2024-07-30 RX ORDER — EPINEPHRINE 1 MG/ML
0.3 INJECTION, SOLUTION INTRAMUSCULAR; SUBCUTANEOUS PRN
OUTPATIENT
Start: 2024-08-06

## 2024-07-30 RX ORDER — SODIUM CHLORIDE 0.9 % (FLUSH) 0.9 %
5-40 SYRINGE (ML) INJECTION PRN
OUTPATIENT
Start: 2024-08-06

## 2024-07-30 RX ORDER — ONDANSETRON 2 MG/ML
8 INJECTION INTRAMUSCULAR; INTRAVENOUS
OUTPATIENT
Start: 2024-08-06

## 2024-07-30 RX ORDER — ACETAMINOPHEN 325 MG/1
650 TABLET ORAL
OUTPATIENT
Start: 2024-08-06

## 2024-07-30 RX ORDER — SODIUM CHLORIDE 9 MG/ML
INJECTION, SOLUTION INTRAVENOUS CONTINUOUS
OUTPATIENT
Start: 2024-08-06

## 2024-07-30 RX ADMIN — SODIUM CHLORIDE 300 MG: 9 INJECTION, SOLUTION INTRAVENOUS at 10:42

## 2024-07-30 RX ADMIN — SODIUM CHLORIDE 20 ML/HR: 9 INJECTION, SOLUTION INTRAVENOUS at 10:38

## 2024-07-30 NOTE — DISCHARGE INSTRUCTIONS
Please contact your Oncologist if you have any questions regarding the infusion of venofer that you received today.      Patient instructed if experience any of the symptoms following today's infusion / to notify MD immediately or go to emergency department.    * dizziness/lightheadedness  *acute nausea/vomiting - not relieved with medication  *headache - not relieved from Tylenol/pain medication  *chest pain/pressure  *rash/itching  *shortness of breath        Drink fluids - 48oz fluids daily  Call if develop fever/ chills/ signs or symptoms of infection

## 2024-07-30 NOTE — PLAN OF CARE
Problem: Discharge Planning  Goal: Discharge to home or other facility with appropriate resources  Outcome: Adequate for Discharge  Flowsheets (Taken 7/30/2024 1457)  Discharge to home or other facility with appropriate resources:   Identify barriers to discharge with patient and caregiver   Arrange for needed discharge resources and transportation as appropriate   Identify discharge learning needs (meds, wound care, etc)  Note: Verbalize understanding of discharge instructions, follow up appointments, and when to call Physician.     Problem: Safety - Adult  Goal: Free from fall injury  Flowsheets (Taken 7/30/2024 1457)  Free From Fall Injury:   Instruct family/caregiver on patient safety   Based on caregiver fall risk screen, instruct family/caregiver to ask for assistance with transferring infant if caregiver noted to have fall risk factors  Note: Free from falls while in O.P. Oncology.     Problem: Chronic Conditions and Co-morbidities  Goal: Patient's chronic conditions and co-morbidity symptoms are monitored and maintained or improved  Flowsheets (Taken 7/30/2024 1457)  Care Plan - Patient's Chronic Conditions and Co-Morbidity Symptoms are Monitored and Maintained or Improved:   Monitor and assess patient's chronic conditions and comorbid symptoms for stability, deterioration, or improvement   Collaborate with multidisciplinary team to address chronic and comorbid conditions and prevent exacerbation or deterioration  Note: Patient verbalizes understanding to verbal information given on Venofer,action and possible side effects. Aware to call MD if develop complications.

## 2024-08-01 ASSESSMENT — ENCOUNTER SYMPTOMS
NAUSEA: 0
DIARRHEA: 0
SHORTNESS OF BREATH: 0
COUGH: 0
CONSTIPATION: 0
VOMITING: 0

## 2024-08-08 ENCOUNTER — HOSPITAL ENCOUNTER (OUTPATIENT)
Dept: INFUSION THERAPY | Age: 59
Discharge: HOME | End: 2024-08-08
Payer: COMMERCIAL

## 2024-08-08 VITALS
OXYGEN SATURATION: 100 % | DIASTOLIC BLOOD PRESSURE: 81 MMHG | RESPIRATION RATE: 18 BRPM | HEART RATE: 52 BPM | SYSTOLIC BLOOD PRESSURE: 139 MMHG | TEMPERATURE: 97.6 F

## 2024-08-08 DIAGNOSIS — D50.8 IRON DEFICIENCY ANEMIA AFTER GASTRECTOMY: ICD-10-CM

## 2024-08-08 DIAGNOSIS — K91.89 IRON DEFICIENCY ANEMIA AFTER GASTRECTOMY: ICD-10-CM

## 2024-08-08 DIAGNOSIS — D50.0 IRON DEFICIENCY ANEMIA SECONDARY TO BLOOD LOSS (CHRONIC): Primary | ICD-10-CM

## 2024-08-08 DIAGNOSIS — Z98.84 HISTORY OF ROUX-EN-Y GASTRIC BYPASS: ICD-10-CM

## 2024-08-08 PROCEDURE — 96365 THER/PROPH/DIAG IV INF INIT: CPT

## 2024-08-08 PROCEDURE — 6360000002 HC RX W HCPCS: Performed by: NURSE PRACTITIONER

## 2024-08-08 PROCEDURE — 96366 THER/PROPH/DIAG IV INF ADDON: CPT

## 2024-08-08 PROCEDURE — 2580000003 HC RX 258: Performed by: NURSE PRACTITIONER

## 2024-08-08 RX ORDER — ACETAMINOPHEN 325 MG/1
650 TABLET ORAL
Status: CANCELLED | OUTPATIENT
Start: 2024-08-13

## 2024-08-08 RX ORDER — ONDANSETRON 2 MG/ML
8 INJECTION INTRAMUSCULAR; INTRAVENOUS
Status: CANCELLED | OUTPATIENT
Start: 2024-08-13

## 2024-08-08 RX ORDER — EPINEPHRINE 1 MG/ML
0.3 INJECTION, SOLUTION INTRAMUSCULAR; SUBCUTANEOUS PRN
Status: CANCELLED | OUTPATIENT
Start: 2024-08-13

## 2024-08-08 RX ORDER — SODIUM CHLORIDE 9 MG/ML
INJECTION, SOLUTION INTRAVENOUS CONTINUOUS
Status: CANCELLED | OUTPATIENT
Start: 2024-08-13

## 2024-08-08 RX ORDER — DIPHENHYDRAMINE HYDROCHLORIDE 50 MG/ML
50 INJECTION INTRAMUSCULAR; INTRAVENOUS
Status: CANCELLED | OUTPATIENT
Start: 2024-08-13

## 2024-08-08 RX ORDER — SODIUM CHLORIDE 9 MG/ML
5-250 INJECTION, SOLUTION INTRAVENOUS PRN
Status: CANCELLED | OUTPATIENT
Start: 2024-08-13

## 2024-08-08 RX ORDER — HEPARIN 100 UNIT/ML
500 SYRINGE INTRAVENOUS PRN
Status: CANCELLED | OUTPATIENT
Start: 2024-08-13

## 2024-08-08 RX ORDER — ALBUTEROL SULFATE 90 UG/1
4 AEROSOL, METERED RESPIRATORY (INHALATION) PRN
Status: CANCELLED | OUTPATIENT
Start: 2024-08-13

## 2024-08-08 RX ORDER — SODIUM CHLORIDE 9 MG/ML
5-250 INJECTION, SOLUTION INTRAVENOUS PRN
Status: DISCONTINUED | OUTPATIENT
Start: 2024-08-08 | End: 2024-08-09 | Stop reason: HOSPADM

## 2024-08-08 RX ORDER — SODIUM CHLORIDE 0.9 % (FLUSH) 0.9 %
5-40 SYRINGE (ML) INJECTION PRN
Status: CANCELLED | OUTPATIENT
Start: 2024-08-13

## 2024-08-08 RX ADMIN — SODIUM CHLORIDE 200 ML/HR: 9 INJECTION, SOLUTION INTRAVENOUS at 10:20

## 2024-08-08 RX ADMIN — SODIUM CHLORIDE 300 MG: 9 INJECTION, SOLUTION INTRAVENOUS at 10:23

## 2024-08-08 NOTE — PROGRESS NOTES
Patient assessed for the following post venofer:    Dizziness   No  Lightheadedness  No      Acute nausea/vomiting No  Headache   No  Chest pain/pressure  No  Rash/itching   No  Shortness of breath  No    Patient kept for 20 minutes observation post infusion.   Patient tolerated venofer without any complications.    Last vital signs:   /81   Pulse 52   Temp 97.6 °F (36.4 °C) (Oral)   Resp 18   SpO2 100%         Patient instructed if experience any of the above symptoms following today's infusion,he/she is to notify MD immediately or go to the emergency department.    Discharge instructions given to patient. Verbalizes understanding. Ambulated off unit per self with belongings.

## 2024-08-08 NOTE — PLAN OF CARE
Problem: Discharge Planning  Goal: Discharge to home or other facility with appropriate resources  Outcome: Adequate for Discharge  Flowsheets (Taken 8/8/2024 1544)  Discharge to home or other facility with appropriate resources:   Identify barriers to discharge with patient and caregiver   Arrange for interpreters to assist at discharge as needed  Note: Verbalized understanding of discharge instructions, follow ups and when to call doctor     Problem: Safety - Adult  Goal: Free from fall injury  Outcome: Adequate for Discharge  Flowsheets (Taken 8/8/2024 1544)  Free From Fall Injury:   Based on caregiver fall risk screen, instruct family/caregiver to ask for assistance with transferring infant if caregiver noted to have fall risk factors   Instruct family/caregiver on patient safety  Note: Free from falls while in infusion center. Verbalized understanding of fall prevention and to ask for assistance with ambulation     Problem: Chronic Conditions and Co-morbidities  Goal: Patient's chronic conditions and co-morbidity symptoms are monitored and maintained or improved  Outcome: Adequate for Discharge  Flowsheets (Taken 8/8/2024 1544)  Care Plan - Patient's Chronic Conditions and Co-Morbidity Symptoms are Monitored and Maintained or Improved:   Monitor and assess patient's chronic conditions and comorbid symptoms for stability, deterioration, or improvement   Collaborate with multidisciplinary team to address chronic and comorbid conditions and prevent exacerbation or deterioration  Note: Patient verbalizes understanding to verbal information given on venofer,action and possible side effects. Aware to call MD if develop complications.

## 2024-08-14 ENCOUNTER — HOSPITAL ENCOUNTER (OUTPATIENT)
Dept: INFUSION THERAPY | Age: 59
Discharge: HOME | End: 2024-08-14
Payer: COMMERCIAL

## 2024-08-14 VITALS
HEART RATE: 56 BPM | HEIGHT: 63 IN | TEMPERATURE: 98.3 F | OXYGEN SATURATION: 99 % | RESPIRATION RATE: 16 BRPM | BODY MASS INDEX: 28.28 KG/M2 | SYSTOLIC BLOOD PRESSURE: 130 MMHG | DIASTOLIC BLOOD PRESSURE: 81 MMHG | WEIGHT: 159.6 LBS

## 2024-08-14 DIAGNOSIS — K91.89 IRON DEFICIENCY ANEMIA AFTER GASTRECTOMY: ICD-10-CM

## 2024-08-14 DIAGNOSIS — Z98.84 HISTORY OF ROUX-EN-Y GASTRIC BYPASS: ICD-10-CM

## 2024-08-14 DIAGNOSIS — D50.8 IRON DEFICIENCY ANEMIA AFTER GASTRECTOMY: ICD-10-CM

## 2024-08-14 DIAGNOSIS — D50.0 IRON DEFICIENCY ANEMIA SECONDARY TO BLOOD LOSS (CHRONIC): Primary | ICD-10-CM

## 2024-08-14 PROCEDURE — 6360000002 HC RX W HCPCS: Performed by: NURSE PRACTITIONER

## 2024-08-14 PROCEDURE — 2580000003 HC RX 258: Performed by: NURSE PRACTITIONER

## 2024-08-14 PROCEDURE — 96365 THER/PROPH/DIAG IV INF INIT: CPT

## 2024-08-14 PROCEDURE — 96366 THER/PROPH/DIAG IV INF ADDON: CPT

## 2024-08-14 RX ORDER — ONDANSETRON 2 MG/ML
8 INJECTION INTRAMUSCULAR; INTRAVENOUS
OUTPATIENT
Start: 2024-08-14

## 2024-08-14 RX ORDER — SODIUM CHLORIDE 9 MG/ML
5-250 INJECTION, SOLUTION INTRAVENOUS PRN
OUTPATIENT
Start: 2024-08-14

## 2024-08-14 RX ORDER — EPINEPHRINE 1 MG/ML
0.3 INJECTION, SOLUTION INTRAMUSCULAR; SUBCUTANEOUS PRN
OUTPATIENT
Start: 2024-08-14

## 2024-08-14 RX ORDER — SODIUM CHLORIDE 0.9 % (FLUSH) 0.9 %
5-40 SYRINGE (ML) INJECTION PRN
OUTPATIENT
Start: 2024-08-14

## 2024-08-14 RX ORDER — ALBUTEROL SULFATE 90 UG/1
4 AEROSOL, METERED RESPIRATORY (INHALATION) PRN
OUTPATIENT
Start: 2024-08-14

## 2024-08-14 RX ORDER — SODIUM CHLORIDE 9 MG/ML
INJECTION, SOLUTION INTRAVENOUS CONTINUOUS
OUTPATIENT
Start: 2024-08-14

## 2024-08-14 RX ORDER — HEPARIN 100 UNIT/ML
500 SYRINGE INTRAVENOUS PRN
OUTPATIENT
Start: 2024-08-14

## 2024-08-14 RX ORDER — ACETAMINOPHEN 325 MG/1
650 TABLET ORAL
OUTPATIENT
Start: 2024-08-14

## 2024-08-14 RX ORDER — SODIUM CHLORIDE 9 MG/ML
5-250 INJECTION, SOLUTION INTRAVENOUS PRN
Status: DISCONTINUED | OUTPATIENT
Start: 2024-08-14 | End: 2024-08-15 | Stop reason: HOSPADM

## 2024-08-14 RX ORDER — DIPHENHYDRAMINE HYDROCHLORIDE 50 MG/ML
50 INJECTION INTRAMUSCULAR; INTRAVENOUS
OUTPATIENT
Start: 2024-08-14

## 2024-08-14 RX ADMIN — SODIUM CHLORIDE 300 MG: 9 INJECTION, SOLUTION INTRAVENOUS at 12:44

## 2024-08-14 RX ADMIN — SODIUM CHLORIDE 55 ML/HR: 9 INJECTION, SOLUTION INTRAVENOUS at 12:27

## 2024-08-14 NOTE — PLAN OF CARE
Problem: Discharge Planning  Goal: Discharge to home or other facility with appropriate resources  8/14/2024 1440 by Mitchell Ayers RN  Outcome: Adequate for Discharge  Flowsheets (Taken 8/14/2024 1440)  Discharge to home or other facility with appropriate resources: Identify barriers to discharge with patient and caregiver  8/14/2024 1440 by Mitchell Ayers RN  Outcome: Adequate for Discharge  Flowsheets (Taken 8/14/2024 1440)  Discharge to home or other facility with appropriate resources: Identify barriers to discharge with patient and caregiver     Problem: Safety - Adult  Goal: Free from fall injury  8/14/2024 1440 by Mitchell Ayers RN  Outcome: Adequate for Discharge  Flowsheets (Taken 8/14/2024 1440)  Free From Fall Injury: Instruct family/caregiver on patient safety  8/14/2024 1440 by Mitchell Ayers RN  Outcome: Adequate for Discharge  Flowsheets (Taken 8/14/2024 1440)  Free From Fall Injury: Instruct family/caregiver on patient safety     Problem: Chronic Conditions and Co-morbidities  Goal: Patient's chronic conditions and co-morbidity symptoms are monitored and maintained or improved  8/14/2024 1440 by Mitchell Ayers RN  Outcome: Adequate for Discharge  Flowsheets (Taken 8/14/2024 1440)  Care Plan - Patient's Chronic Conditions and Co-Morbidity Symptoms are Monitored and Maintained or Improved: Monitor and assess patient's chronic conditions and comorbid symptoms for stability, deterioration, or improvement  Note: Venofer reviewed, patient verbalizes understanding of medication being administered and potential side effects.    8/14/2024 1440 by Mitchell Ayers RN  Outcome: Adequate for Discharge  Flowsheets (Taken 8/14/2024 1440)  Care Plan - Patient's Chronic Conditions and Co-Morbidity Symptoms are Monitored and Maintained or Improved: Monitor and assess patient's chronic conditions and comorbid symptoms for stability, deterioration, or improvement

## 2024-08-14 NOTE — DISCHARGE INSTRUCTIONS
Please contact your Oncologist if you have any questions regarding the iron treatment Venofer that you received today.      Patient instructed if experience any of the symptoms following today's iron treatment to notify MD immediately or go to emergency department.    * dizziness/lightheadedness  *acute nausea/vomiting - not relieved with medication  *headache - not relieved from Tylenol/pain medication  *chest pain/pressure  *rash/itching  *shortness of breath        Drink fluids - 48oz fluids daily  Call if develop fever/ chills/ signs or symptoms of infection

## 2024-08-14 NOTE — PROGRESS NOTES
Patient assessed for the following post iron treatment:    Dizziness   No  Lightheadedness  No      Acute nausea/vomiting No  Headache   No  Chest pain/pressure  No  Rash/itching   No  Shortness of breath  No    Patient kept for 20 minutes observation post infusion iron.    Patient tolerated iron treatment Venofer without any complications.    Last vital signs:   /81   Pulse 56   Temp 98.3 °F (36.8 °C) (Oral)   Resp 16   Ht 1.6 m (5' 2.99\")   Wt 72.4 kg (159 lb 9.6 oz)   SpO2 99%   BMI 28.28 kg/m²     Patient instructed if experience any of the above symptoms following today's infusion, she is to notify MD immediately or go to the emergency department.    Discharge instructions given to patient. Verbalizes understanding. Ambulated off unit per self, with belongings.

## 2024-08-19 ENCOUNTER — OFFICE VISIT (OUTPATIENT)
Dept: FAMILY MEDICINE CLINIC | Age: 59
End: 2024-08-19
Payer: COMMERCIAL

## 2024-08-19 VITALS
WEIGHT: 158.9 LBS | DIASTOLIC BLOOD PRESSURE: 70 MMHG | HEART RATE: 68 BPM | RESPIRATION RATE: 12 BRPM | BODY MASS INDEX: 28.16 KG/M2 | SYSTOLIC BLOOD PRESSURE: 122 MMHG

## 2024-08-19 DIAGNOSIS — D50.9 IRON DEFICIENCY ANEMIA, UNSPECIFIED IRON DEFICIENCY ANEMIA TYPE: Primary | ICD-10-CM

## 2024-08-19 DIAGNOSIS — E66.3 OVERWEIGHT (BMI 25.0-29.9): ICD-10-CM

## 2024-08-19 DIAGNOSIS — Z98.84 HISTORY OF ROUX-EN-Y GASTRIC BYPASS: ICD-10-CM

## 2024-08-19 PROCEDURE — 99213 OFFICE O/P EST LOW 20 MIN: CPT | Performed by: FAMILY MEDICINE

## 2024-08-19 PROCEDURE — G2211 COMPLEX E/M VISIT ADD ON: HCPCS | Performed by: FAMILY MEDICINE

## 2024-08-19 RX ORDER — PHENTERMINE HYDROCHLORIDE 37.5 MG/1
37.5 TABLET ORAL
Qty: 30 TABLET | Refills: 0 | Status: SHIPPED | OUTPATIENT
Start: 2024-08-19 | End: 2024-09-18

## 2024-08-19 ASSESSMENT — ENCOUNTER SYMPTOMS
RESPIRATORY NEGATIVE: 1
GASTROINTESTINAL NEGATIVE: 1

## 2024-08-19 NOTE — PROGRESS NOTES
Therese Mayer (:  1965) is a 58 y.o. female,Established patient, here for evaluation of the following chief complaint(s):  1 Month Follow-Up (#2 on adipex, down 2 lbs, 7 lbs overall/)          Subjective   SUBJECTIVE/OBJECTIVE:  HPI  Chief Complaint   Patient presents with    1 Month Follow-Up     #2 on adipex, down 2 lbs, 7 lbs overall       1 month eval.    Down total of 7 lbs after 2 mos.  Tolerating fine.  Wt Readings from Last 3 Encounters:   24 72.1 kg (158 lb 14.4 oz)   24 72.4 kg (159 lb 9.6 oz)   24 72.8 kg (160 lb 6.4 oz)     S/P iron infusion x 3.  Scheduled for EGD and colonoscopy.    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 Max Daily Amount: 37.5 mg 30 tablet 0    ondansetron (ZOFRAN-ODT) 4 MG disintegrating tablet PLACE 1 TABLET ON THE TONGUE EVERY 8 HOURS AS NEEDED FOR NAUSEA OR VOMITING 270 tablet 0    metoprolol tartrate (LOPRESSOR) 25 MG tablet Take 1 tablet by mouth 2 times daily 180 tablet 3    RABEprazole (ACIPHEX) 20 MG tablet Take 1 tablet by mouth daily 90 tablet 3    famotidine (PEPCID) 20 MG tablet TAKE 1 TABLET TWICE A  tablet 3    Cholecalciferol (VITAMIN D-3 PO) Take 5,000 Int'l Units/day by mouth      aspirin 81 MG chewable tablet Take 1 tablet by mouth daily      estrogens, conjugated, (PREMARIN) 0.625 MG tablet Take 1 tablet by mouth daily       No current facility-administered medications for this visit.       Past Surgical History:   Procedure

## 2024-09-24 DIAGNOSIS — D50.8 IRON DEFICIENCY ANEMIA AFTER GASTRECTOMY: Primary | ICD-10-CM

## 2024-09-24 DIAGNOSIS — K91.89 IRON DEFICIENCY ANEMIA AFTER GASTRECTOMY: Primary | ICD-10-CM

## 2024-09-24 ASSESSMENT — ENCOUNTER SYMPTOMS
NAUSEA: 0
COUGH: 0
SHORTNESS OF BREATH: 0
VOMITING: 0
DIARRHEA: 0

## 2024-09-25 ENCOUNTER — OFFICE VISIT (OUTPATIENT)
Dept: ONCOLOGY | Age: 59
End: 2024-09-25
Payer: COMMERCIAL

## 2024-09-25 ENCOUNTER — HOSPITAL ENCOUNTER (OUTPATIENT)
Dept: INFUSION THERAPY | Age: 59
Discharge: HOME | End: 2024-09-25
Payer: COMMERCIAL

## 2024-09-25 VITALS
OXYGEN SATURATION: 100 % | DIASTOLIC BLOOD PRESSURE: 78 MMHG | RESPIRATION RATE: 18 BRPM | HEIGHT: 63 IN | HEART RATE: 53 BPM | BODY MASS INDEX: 27.82 KG/M2 | SYSTOLIC BLOOD PRESSURE: 143 MMHG | TEMPERATURE: 97.4 F | WEIGHT: 157 LBS

## 2024-09-25 VITALS
SYSTOLIC BLOOD PRESSURE: 143 MMHG | DIASTOLIC BLOOD PRESSURE: 78 MMHG | HEART RATE: 53 BPM | RESPIRATION RATE: 18 BRPM | TEMPERATURE: 97.4 F

## 2024-09-25 DIAGNOSIS — E66.3 OVERWEIGHT (BMI 25.0-29.9): ICD-10-CM

## 2024-09-25 DIAGNOSIS — R19.5 POSITIVE COLORECTAL CANCER SCREENING USING COLOGUARD TEST: ICD-10-CM

## 2024-09-25 DIAGNOSIS — D72.829 LEUKOCYTOSIS, UNSPECIFIED TYPE: ICD-10-CM

## 2024-09-25 DIAGNOSIS — K91.89 IRON DEFICIENCY ANEMIA AFTER GASTRECTOMY: ICD-10-CM

## 2024-09-25 DIAGNOSIS — D50.8 IRON DEFICIENCY ANEMIA AFTER GASTRECTOMY: Primary | ICD-10-CM

## 2024-09-25 DIAGNOSIS — Z98.84 HISTORY OF ROUX-EN-Y GASTRIC BYPASS: ICD-10-CM

## 2024-09-25 DIAGNOSIS — D50.8 IRON DEFICIENCY ANEMIA AFTER GASTRECTOMY: ICD-10-CM

## 2024-09-25 DIAGNOSIS — K91.89 IRON DEFICIENCY ANEMIA AFTER GASTRECTOMY: Primary | ICD-10-CM

## 2024-09-25 LAB
BASOPHILS ABSOLUTE: 0 THOU/MM3 (ref 0–0.1)
BASOPHILS NFR BLD AUTO: 0.7 %
DEPRECATED RDW RBC AUTO: ABNORMAL FL (ref 35–45)
EOSINOPHIL NFR BLD AUTO: 2.1 %
EOSINOPHILS ABSOLUTE: 0.1 THOU/MM3 (ref 0–0.4)
ERYTHROCYTE [DISTWIDTH] IN BLOOD BY AUTOMATED COUNT: ABNORMAL % (ref 11.5–14.5)
FERRITIN SERPL IA-MCNC: 72 NG/ML (ref 10–291)
HCT VFR BLD AUTO: 38.9 % (ref 37–47)
HGB BLD-MCNC: 11.9 GM/DL (ref 12–16)
IMM GRANULOCYTES # BLD AUTO: 0.02 THOU/MM3 (ref 0–0.07)
IMM GRANULOCYTES NFR BLD AUTO: 0.5 %
IRON SATN MFR SERPL: 26 % (ref 20–50)
IRON SERPL-MCNC: 97 UG/DL (ref 50–170)
LYMPHOCYTES ABSOLUTE: 1.4 THOU/MM3 (ref 1–4.8)
LYMPHOCYTES NFR BLD AUTO: 33.6 %
MACROCYTES BLD QL SMEAR: PRESENT
MCH RBC QN AUTO: 26.7 PG (ref 26–33)
MCHC RBC AUTO-ENTMCNC: 30.6 GM/DL (ref 32.2–35.5)
MCV RBC AUTO: 87.2 FL (ref 81–99)
MONOCYTES ABSOLUTE: 0.4 THOU/MM3 (ref 0.4–1.3)
MONOCYTES NFR BLD AUTO: 9.3 %
NEUTROPHILS ABSOLUTE: 2.3 THOU/MM3 (ref 1.8–7.7)
NEUTROPHILS NFR BLD AUTO: 53.8 %
NRBC BLD AUTO-RTO: 0 /100 WBC
PLATELET # BLD AUTO: 295 THOU/MM3 (ref 130–400)
PLATELET BLD QL SMEAR: ADEQUATE
PMV BLD AUTO: 10.3 FL (ref 9.4–12.4)
POIKILOCYTES: ABNORMAL
RBC # BLD AUTO: 4.46 MILL/MM3 (ref 4.2–5.4)
SCAN OF BLOOD SMEAR: NORMAL
TIBC SERPL-MCNC: 378 UG/DL (ref 171–450)
WBC # BLD AUTO: 4.3 THOU/MM3 (ref 4.8–10.8)

## 2024-09-25 PROCEDURE — 83550 IRON BINDING TEST: CPT

## 2024-09-25 PROCEDURE — 36415 COLL VENOUS BLD VENIPUNCTURE: CPT

## 2024-09-25 PROCEDURE — 99211 OFF/OP EST MAY X REQ PHY/QHP: CPT

## 2024-09-25 PROCEDURE — 82728 ASSAY OF FERRITIN: CPT

## 2024-09-25 PROCEDURE — 99214 OFFICE O/P EST MOD 30 MIN: CPT | Performed by: NURSE PRACTITIONER

## 2024-09-25 PROCEDURE — 83540 ASSAY OF IRON: CPT

## 2024-09-25 PROCEDURE — 85025 COMPLETE CBC W/AUTO DIFF WBC: CPT

## 2024-09-25 RX ORDER — PHENTERMINE HYDROCHLORIDE 37.5 MG/1
TABLET ORAL
Qty: 30 TABLET | OUTPATIENT
Start: 2024-09-25

## 2024-09-27 ASSESSMENT — ENCOUNTER SYMPTOMS
BLOOD IN STOOL: 0
ANAL BLEEDING: 0
CONSTIPATION: 1
ABDOMINAL PAIN: 0

## 2024-10-04 ENCOUNTER — COMMUNITY OUTREACH (OUTPATIENT)
Dept: FAMILY MEDICINE CLINIC | Age: 59
End: 2024-10-04

## 2024-10-04 NOTE — PROGRESS NOTES
Patient's HM shows they are overdue for Mammogram.  Care Everywhere and  files searched.  No results to attach to order nor HM updated.

## 2024-10-11 DIAGNOSIS — K21.9 GASTROESOPHAGEAL REFLUX DISEASE, UNSPECIFIED WHETHER ESOPHAGITIS PRESENT: ICD-10-CM

## 2024-10-11 RX ORDER — FAMOTIDINE 20 MG/1
20 TABLET, FILM COATED ORAL 2 TIMES DAILY
Qty: 180 TABLET | Refills: 3 | Status: SHIPPED | OUTPATIENT
Start: 2024-10-11

## 2025-01-29 ENCOUNTER — OFFICE VISIT (OUTPATIENT)
Dept: ONCOLOGY | Age: 60
End: 2025-01-29
Payer: COMMERCIAL

## 2025-01-29 ENCOUNTER — HOSPITAL ENCOUNTER (OUTPATIENT)
Dept: INFUSION THERAPY | Age: 60
Discharge: HOME | End: 2025-01-29
Payer: COMMERCIAL

## 2025-01-29 VITALS
TEMPERATURE: 97.7 F | SYSTOLIC BLOOD PRESSURE: 173 MMHG | RESPIRATION RATE: 18 BRPM | HEART RATE: 59 BPM | BODY MASS INDEX: 27.82 KG/M2 | WEIGHT: 157 LBS | DIASTOLIC BLOOD PRESSURE: 81 MMHG | OXYGEN SATURATION: 97 % | HEIGHT: 63 IN

## 2025-01-29 VITALS
OXYGEN SATURATION: 97 % | HEART RATE: 59 BPM | TEMPERATURE: 97.7 F | SYSTOLIC BLOOD PRESSURE: 173 MMHG | RESPIRATION RATE: 18 BRPM | DIASTOLIC BLOOD PRESSURE: 81 MMHG

## 2025-01-29 DIAGNOSIS — K91.89 IRON DEFICIENCY ANEMIA AFTER GASTRECTOMY: ICD-10-CM

## 2025-01-29 DIAGNOSIS — D50.8 IRON DEFICIENCY ANEMIA AFTER GASTRECTOMY: ICD-10-CM

## 2025-01-29 DIAGNOSIS — K91.89 IRON DEFICIENCY ANEMIA AFTER GASTRECTOMY: Primary | ICD-10-CM

## 2025-01-29 DIAGNOSIS — D50.8 IRON DEFICIENCY ANEMIA AFTER GASTRECTOMY: Primary | ICD-10-CM

## 2025-01-29 LAB
BASOPHILS ABSOLUTE: 0 THOU/MM3 (ref 0–0.1)
BASOPHILS NFR BLD AUTO: 1 % (ref 0–3)
EOSINOPHIL NFR BLD AUTO: 2 % (ref 0–4)
EOSINOPHILS ABSOLUTE: 0.1 THOU/MM3 (ref 0–0.4)
ERYTHROCYTE [DISTWIDTH] IN BLOOD BY AUTOMATED COUNT: 14 % (ref 11.5–14.5)
FERRITIN SERPL IA-MCNC: 35 NG/ML (ref 10–291)
HCT VFR BLD AUTO: 38.3 % (ref 37–47)
HGB BLD-MCNC: 12.2 GM/DL (ref 12–16)
IMMATURE GRANULOCYTES %: 0 %
IMMATURE GRANULOCYTES ABSOLUTE: 0 THOU/MM3 (ref 0–0.07)
IRON SATN MFR SERPL: 24 % (ref 20–50)
IRON SERPL-MCNC: 87 UG/DL (ref 50–170)
LYMPHOCYTES ABSOLUTE: 1.3 THOU/MM3 (ref 1–4.8)
LYMPHOCYTES NFR BLD AUTO: 26 % (ref 15–47)
MCH RBC QN AUTO: 29.9 PG (ref 26–33)
MCHC RBC AUTO-ENTMCNC: 31.9 GM/DL (ref 32.2–35.5)
MCV RBC AUTO: 94 FL (ref 81–99)
MONOCYTES ABSOLUTE: 0.5 THOU/MM3 (ref 0.4–1.3)
MONOCYTES NFR BLD AUTO: 9 % (ref 0–12)
NEUTROPHILS ABSOLUTE: 3.3 THOU/MM3 (ref 1.8–7.7)
NEUTROPHILS NFR BLD AUTO: 64 % (ref 43–75)
PLATELET # BLD AUTO: 252 THOU/MM3 (ref 130–400)
PMV BLD AUTO: 9.5 FL (ref 9.4–12.4)
RBC # BLD AUTO: 4.08 MILL/MM3 (ref 4.2–5.4)
TIBC SERPL-MCNC: 370 UG/DL (ref 171–450)
WBC # BLD AUTO: 5.2 THOU/MM3 (ref 4.8–10.8)

## 2025-01-29 PROCEDURE — 82728 ASSAY OF FERRITIN: CPT

## 2025-01-29 PROCEDURE — 83550 IRON BINDING TEST: CPT

## 2025-01-29 PROCEDURE — 85025 COMPLETE CBC W/AUTO DIFF WBC: CPT

## 2025-01-29 PROCEDURE — 99213 OFFICE O/P EST LOW 20 MIN: CPT | Performed by: NURSE PRACTITIONER

## 2025-01-29 PROCEDURE — 36415 COLL VENOUS BLD VENIPUNCTURE: CPT

## 2025-01-29 PROCEDURE — 83540 ASSAY OF IRON: CPT

## 2025-01-29 PROCEDURE — 99211 OFF/OP EST MAY X REQ PHY/QHP: CPT

## 2025-01-29 NOTE — PROGRESS NOTES
[facility] [address], [address]  [facility] [address]  Dept: [phone]  Dept Fax: [phone]  Loc: [phone]      Therese Mayer is a 59 y.o. female who presents today for Follow-up (Iron deficiency anemia after gastrectomy)      HPI:     hTerese Mayer is a 59-year-old female who is here for follow-up for iron deficiency anemia.  She is status post Rahul-en-Y.  In July 2024 she was noted to have hemoglobin she was last seen in September 2024 and found to have hemoglobin of 7.8, ferritin of 6, Iron 21, TIBC 468 and iron sat of 4.  She received 3 doses of Venofer and labs drawn in September showed hemoglobin 11.9, hematocrit 38.9, MCV 87.2, ferritin 72, iron 97, iron saturation 26.  Today her hemoglobin was 12.2, hematocrit 38.3, MCV 94.  Iron studies are pending    She denies headache, dizziness, chest pain or palpitations, SOB/HASKINS, abdominal pain.  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  We discussed that I would call her with labs and any need for IV iron.  I answered all her questions.    The patient has No Known Allergies.    Past Medical History  Therese  has a past medical history of Anemia, Arthritis, and Atypical chest pain.    Medications    Current Outpatient Medications:     famotidine (PEPCID) 20 MG tablet, TAKE 1 TABLET TWICE A DAY, Disp: 180 tablet, Rfl: 3    ondansetron (ZOFRAN-ODT) 4 MG disintegrating tablet, PLACE 1 TABLET ON THE TONGUE EVERY 8 HOURS AS NEEDED FOR NAUSEA OR VOMITING, Disp: 270 tablet, Rfl: 0    metoprolol tartrate (LOPRESSOR) 25 MG tablet, Take 1 tablet by mouth 2 times daily, Disp: 180 tablet, Rfl: 3    RABEprazole (ACIPHEX) 20 MG tablet, Take 1 tablet by mouth daily, Disp: 90 tablet, Rfl: 3    Cholecalciferol (VITAMIN D-3 PO), Take 5,000 Int'l Units/day by mouth, Disp: , Rfl:     aspirin 81 MG chewable tablet, Take 1 tablet by mouth daily, Disp: , Rfl:     estrogens, conjugated, (PREMARIN) 0.625 MG

## 2025-06-23 RX ORDER — METOPROLOL TARTRATE 25 MG/1
25 TABLET, FILM COATED ORAL 2 TIMES DAILY
Qty: 180 TABLET | Refills: 3 | Status: SHIPPED | OUTPATIENT
Start: 2025-06-23

## 2025-06-25 ENCOUNTER — OFFICE VISIT (OUTPATIENT)
Dept: FAMILY MEDICINE CLINIC | Age: 60
End: 2025-06-25
Payer: COMMERCIAL

## 2025-06-25 VITALS
HEART RATE: 68 BPM | RESPIRATION RATE: 16 BRPM | WEIGHT: 158.3 LBS | BODY MASS INDEX: 28.05 KG/M2 | DIASTOLIC BLOOD PRESSURE: 78 MMHG | SYSTOLIC BLOOD PRESSURE: 132 MMHG

## 2025-06-25 DIAGNOSIS — Z00.00 WELL ADULT HEALTH CHECK: Primary | ICD-10-CM

## 2025-06-25 DIAGNOSIS — K21.9 GASTROESOPHAGEAL REFLUX DISEASE, UNSPECIFIED WHETHER ESOPHAGITIS PRESENT: ICD-10-CM

## 2025-06-25 DIAGNOSIS — E66.3 OVERWEIGHT (BMI 25.0-29.9): ICD-10-CM

## 2025-06-25 DIAGNOSIS — Z78.0 POST-MENOPAUSAL: ICD-10-CM

## 2025-06-25 DIAGNOSIS — Z12.31 ENCOUNTER FOR SCREENING MAMMOGRAM FOR MALIGNANT NEOPLASM OF BREAST: ICD-10-CM

## 2025-06-25 DIAGNOSIS — Z98.84 HISTORY OF ROUX-EN-Y GASTRIC BYPASS: ICD-10-CM

## 2025-06-25 DIAGNOSIS — D50.9 IRON DEFICIENCY ANEMIA, UNSPECIFIED IRON DEFICIENCY ANEMIA TYPE: ICD-10-CM

## 2025-06-25 PROCEDURE — 99396 PREV VISIT EST AGE 40-64: CPT | Performed by: FAMILY MEDICINE

## 2025-06-25 RX ORDER — RABEPRAZOLE SODIUM 20 MG/1
20 TABLET, DELAYED RELEASE ORAL DAILY
Qty: 90 TABLET | Refills: 3 | Status: SHIPPED | OUTPATIENT
Start: 2025-06-25

## 2025-06-25 RX ORDER — PHENTERMINE HYDROCHLORIDE 37.5 MG/1
37.5 TABLET ORAL
Qty: 30 TABLET | Refills: 0 | Status: SHIPPED | OUTPATIENT
Start: 2025-06-25 | End: 2025-07-25

## 2025-06-25 SDOH — ECONOMIC STABILITY: FOOD INSECURITY: WITHIN THE PAST 12 MONTHS, THE FOOD YOU BOUGHT JUST DIDN'T LAST AND YOU DIDN'T HAVE MONEY TO GET MORE.: NEVER TRUE

## 2025-06-25 SDOH — ECONOMIC STABILITY: FOOD INSECURITY: WITHIN THE PAST 12 MONTHS, YOU WORRIED THAT YOUR FOOD WOULD RUN OUT BEFORE YOU GOT MONEY TO BUY MORE.: NEVER TRUE

## 2025-06-25 ASSESSMENT — PATIENT HEALTH QUESTIONNAIRE - PHQ9
1. LITTLE INTEREST OR PLEASURE IN DOING THINGS: NOT AT ALL
SUM OF ALL RESPONSES TO PHQ QUESTIONS 1-9: 0
SUM OF ALL RESPONSES TO PHQ QUESTIONS 1-9: 0
SUM OF ALL RESPONSES TO PHQ9 QUESTIONS 1 & 2: 0
1. LITTLE INTEREST OR PLEASURE IN DOING THINGS: NOT AT ALL
SUM OF ALL RESPONSES TO PHQ QUESTIONS 1-9: 0
2. FEELING DOWN, DEPRESSED OR HOPELESS: NOT AT ALL
2. FEELING DOWN, DEPRESSED OR HOPELESS: NOT AT ALL
SUM OF ALL RESPONSES TO PHQ QUESTIONS 1-9: 0

## 2025-06-25 ASSESSMENT — ENCOUNTER SYMPTOMS
GASTROINTESTINAL NEGATIVE: 1
RESPIRATORY NEGATIVE: 1

## 2025-06-25 NOTE — PROGRESS NOTES
conjugated, (PREMARIN) 0.625 MG tablet Take 1 tablet by mouth daily       No current facility-administered medications for this visit.       Past Surgical History:   Procedure Laterality Date    BREAST SURGERY      tumors    GASTRIC BYPASS SURGERY  2006    HERNIA REPAIR  1969    HYSTERECTOMY (CERVIX STATUS UNKNOWN)      HYSTERECTOMY, TOTAL ABDOMINAL (CERVIX REMOVED)      OVARIAN CYST REMOVAL      multiple    TONSILLECTOMY      TOOTH EXTRACTION  2018    UPPER GASTROINTESTINAL ENDOSCOPY         Review of Systems   Constitutional: Negative.    HENT: Negative.     Respiratory: Negative.     Cardiovascular: Negative.    Gastrointestinal: Negative.    Musculoskeletal: Negative.    All other systems reviewed and are negative.         Objective   Physical Exam  Vitals and nursing note reviewed.   Constitutional:       General: She is not in acute distress.     Appearance: Normal appearance. She is well-developed.   HENT:      Head: Normocephalic and atraumatic.      Right Ear: Tympanic membrane normal.      Left Ear: Tympanic membrane normal.   Eyes:      Conjunctiva/sclera: Conjunctivae normal.   Cardiovascular:      Rate and Rhythm: Normal rate and regular rhythm.      Heart sounds: Normal heart sounds. No murmur heard.  Pulmonary:      Effort: Pulmonary effort is normal.      Breath sounds: Normal breath sounds. No wheezing, rhonchi or rales.   Abdominal:      General: There is no distension.   Musculoskeletal:      Cervical back: Neck supple.   Skin:     General: Skin is warm and dry.      Findings: No rash (on exposed surfaces).   Neurological:      General: No focal deficit present.      Mental Status: She is alert.   Psychiatric:         Attention and Perception: Attention normal.         Mood and Affect: Mood normal.         Speech: Speech normal.         Behavior: Behavior normal. Behavior is cooperative.         Thought Content: Thought content normal.         Judgment: Judgment normal.            Assessment &

## 2025-07-23 ENCOUNTER — OFFICE VISIT (OUTPATIENT)
Dept: FAMILY MEDICINE CLINIC | Age: 60
End: 2025-07-23
Payer: COMMERCIAL

## 2025-07-23 VITALS
HEART RATE: 56 BPM | RESPIRATION RATE: 16 BRPM | BODY MASS INDEX: 28.6 KG/M2 | TEMPERATURE: 98 F | SYSTOLIC BLOOD PRESSURE: 124 MMHG | OXYGEN SATURATION: 99 % | HEIGHT: 62 IN | DIASTOLIC BLOOD PRESSURE: 82 MMHG | WEIGHT: 155.4 LBS

## 2025-07-23 DIAGNOSIS — E66.3 OVERWEIGHT (BMI 25.0-29.9): Primary | ICD-10-CM

## 2025-07-23 PROCEDURE — 99214 OFFICE O/P EST MOD 30 MIN: CPT | Performed by: FAMILY MEDICINE

## 2025-07-23 RX ORDER — PHENTERMINE HYDROCHLORIDE 37.5 MG/1
37.5 TABLET ORAL
Qty: 30 TABLET | Refills: 0 | Status: SHIPPED | OUTPATIENT
Start: 2025-07-23 | End: 2025-08-22

## 2025-07-23 ASSESSMENT — ENCOUNTER SYMPTOMS
RESPIRATORY NEGATIVE: 1
GASTROINTESTINAL NEGATIVE: 1

## 2025-07-23 NOTE — PROGRESS NOTES
Therese Mayer (:  1965) is a 59 y.o. female,Established patient, here for evaluation of the following chief complaint(s):  Follow-up (Patient presents for a 4 week follow up. Patient denies concerns.)          Subjective   SUBJECTIVE/OBJECTIVE:  HPI  Chief Complaint   Patient presents with    Follow-up     Patient presents for a 4 week follow up. Patient denies concerns.     Down 3 lbs after month #1 of the Adipex.    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 Max Daily Amount: 37.5 mg 30 tablet 0    RABEprazole (ACIPHEX) 20 MG tablet Take 1 tablet by mouth daily 90 tablet 3    metoprolol tartrate (LOPRESSOR) 25 MG tablet TAKE 1 TABLET TWICE A  tablet 3    famotidine (PEPCID) 20 MG tablet TAKE 1 TABLET TWICE A  tablet 3    ondansetron (ZOFRAN-ODT) 4 MG disintegrating tablet PLACE 1 TABLET ON THE TONGUE EVERY 8 HOURS AS NEEDED FOR NAUSEA OR VOMITING 270 tablet 0    Cholecalciferol (VITAMIN D-3 PO) Take 5,000 Int'l Units/day by mouth      aspirin 81 MG chewable tablet Take 1 tablet by mouth daily      estrogens, conjugated, (PREMARIN) 0.625 MG tablet Take 1 tablet by mouth daily       No current facility-administered medications for this visit.       Past Surgical History:   Procedure Laterality Date    BREAST SURGERY      tumors    GASTRIC BYPASS